# Patient Record
Sex: FEMALE | Race: WHITE | HISPANIC OR LATINO | Employment: UNEMPLOYED | URBAN - NONMETROPOLITAN AREA
[De-identification: names, ages, dates, MRNs, and addresses within clinical notes are randomized per-mention and may not be internally consistent; named-entity substitution may affect disease eponyms.]

---

## 2019-07-11 ENCOUNTER — HOSPITAL ENCOUNTER (OUTPATIENT)
Facility: HOSPITAL | Age: 74
Setting detail: OBSERVATION
Discharge: HOME | End: 2019-07-11
Attending: EMERGENCY MEDICINE | Admitting: FAMILY MEDICINE
Payer: COMMERCIAL

## 2019-07-11 ENCOUNTER — APPOINTMENT (EMERGENCY)
Dept: CT IMAGING | Facility: HOSPITAL | Age: 74
End: 2019-07-11
Payer: COMMERCIAL

## 2019-07-11 ENCOUNTER — APPOINTMENT (OUTPATIENT)
Dept: NUCLEAR MEDICINE | Facility: HOSPITAL | Age: 74
End: 2019-07-11
Payer: COMMERCIAL

## 2019-07-11 ENCOUNTER — APPOINTMENT (OUTPATIENT)
Dept: NON INVASIVE DIAGNOSTICS | Facility: HOSPITAL | Age: 74
End: 2019-07-11
Payer: COMMERCIAL

## 2019-07-11 ENCOUNTER — APPOINTMENT (EMERGENCY)
Dept: RADIOLOGY | Facility: HOSPITAL | Age: 74
End: 2019-07-11
Payer: COMMERCIAL

## 2019-07-11 VITALS
BODY MASS INDEX: 31 KG/M2 | HEART RATE: 63 BPM | RESPIRATION RATE: 18 BRPM | WEIGHT: 168.43 LBS | HEIGHT: 62 IN | DIASTOLIC BLOOD PRESSURE: 60 MMHG | SYSTOLIC BLOOD PRESSURE: 119 MMHG | TEMPERATURE: 97.4 F | OXYGEN SATURATION: 96 %

## 2019-07-11 DIAGNOSIS — E11.9 TYPE 2 DIABETES MELLITUS WITHOUT COMPLICATION, WITHOUT LONG-TERM CURRENT USE OF INSULIN (HCC): ICD-10-CM

## 2019-07-11 DIAGNOSIS — R07.89 OTHER CHEST PAIN: ICD-10-CM

## 2019-07-11 DIAGNOSIS — E87.2 LACTIC ACIDOSIS: ICD-10-CM

## 2019-07-11 DIAGNOSIS — E03.9 HYPOTHYROID: ICD-10-CM

## 2019-07-11 DIAGNOSIS — E78.5 HYPERLIPIDEMIA: ICD-10-CM

## 2019-07-11 DIAGNOSIS — E03.9 HYPOTHYROIDISM: ICD-10-CM

## 2019-07-11 DIAGNOSIS — R07.9 CHEST PAIN: Primary | ICD-10-CM

## 2019-07-11 DIAGNOSIS — R73.9 HYPERGLYCEMIA: ICD-10-CM

## 2019-07-11 PROBLEM — R79.89 ELEVATED LACTIC ACID LEVEL: Status: ACTIVE | Noted: 2019-07-11

## 2019-07-11 PROBLEM — R79.89 ELEVATED TSH: Status: ACTIVE | Noted: 2019-07-11

## 2019-07-11 LAB
ALBUMIN SERPL BCP-MCNC: 3.5 G/DL (ref 3.5–5)
ALP SERPL-CCNC: 117 U/L (ref 46–116)
ALT SERPL W P-5'-P-CCNC: 28 U/L (ref 12–78)
ANION GAP SERPL CALCULATED.3IONS-SCNC: 3 MMOL/L (ref 4–13)
APTT PPP: 31 SECONDS (ref 23–37)
AST SERPL W P-5'-P-CCNC: 19 U/L (ref 5–45)
BASOPHILS # BLD AUTO: 0.04 THOUSANDS/ΜL (ref 0–0.1)
BASOPHILS NFR BLD AUTO: 0 % (ref 0–1)
BILIRUB SERPL-MCNC: 0.3 MG/DL (ref 0.2–1)
BUN SERPL-MCNC: 11 MG/DL (ref 5–25)
CALCIUM SERPL-MCNC: 8.5 MG/DL (ref 8.3–10.1)
CHEST PAIN STATEMENT: NORMAL
CHLORIDE SERPL-SCNC: 106 MMOL/L (ref 100–108)
CHOLEST SERPL-MCNC: 179 MG/DL (ref 50–200)
CO2 SERPL-SCNC: 31 MMOL/L (ref 21–32)
CREAT SERPL-MCNC: 1.16 MG/DL (ref 0.6–1.3)
DEPRECATED D DIMER PPP: <270 NG/ML (FEU)
EOSINOPHIL # BLD AUTO: 0.01 THOUSAND/ΜL (ref 0–0.61)
EOSINOPHIL NFR BLD AUTO: 0 % (ref 0–6)
ERYTHROCYTE [DISTWIDTH] IN BLOOD BY AUTOMATED COUNT: 13.3 % (ref 11.6–15.1)
GFR SERPL CREATININE-BSD FRML MDRD: 46 ML/MIN/1.73SQ M
GLUCOSE SERPL-MCNC: 148 MG/DL (ref 65–140)
HCT VFR BLD AUTO: 40.7 % (ref 34.8–46.1)
HDLC SERPL-MCNC: 47 MG/DL (ref 40–60)
HGB BLD-MCNC: 13 G/DL (ref 11.5–15.4)
IMM GRANULOCYTES # BLD AUTO: 0.03 THOUSAND/UL (ref 0–0.2)
IMM GRANULOCYTES NFR BLD AUTO: 0 % (ref 0–2)
INR PPP: 0.98 (ref 0.84–1.19)
LACTATE SERPL-SCNC: 1.8 MMOL/L (ref 0.5–2)
LACTATE SERPL-SCNC: 2.1 MMOL/L (ref 0.5–2)
LACTATE SERPL-SCNC: 2.2 MMOL/L (ref 0.5–2)
LACTATE SERPL-SCNC: 2.3 MMOL/L (ref 0.5–2)
LDLC SERPL CALC-MCNC: 112 MG/DL (ref 0–100)
LIPASE SERPL-CCNC: 301 U/L (ref 73–393)
LYMPHOCYTES # BLD AUTO: 5.32 THOUSANDS/ΜL (ref 0.6–4.47)
LYMPHOCYTES NFR BLD AUTO: 50 % (ref 14–44)
MAGNESIUM SERPL-MCNC: 2.2 MG/DL (ref 1.6–2.6)
MAX DIASTOLIC BP: 80 MMHG
MAX HEART RATE: 110 BPM
MAX PREDICTED HEART RATE: 146 BPM
MAX. SYSTOLIC BP: 132 MMHG
MCH RBC QN AUTO: 29.3 PG (ref 26.8–34.3)
MCHC RBC AUTO-ENTMCNC: 31.9 G/DL (ref 31.4–37.4)
MCV RBC AUTO: 92 FL (ref 82–98)
MONOCYTES # BLD AUTO: 0.87 THOUSAND/ΜL (ref 0.17–1.22)
MONOCYTES NFR BLD AUTO: 8 % (ref 4–12)
NEUTROPHILS # BLD AUTO: 4.55 THOUSANDS/ΜL (ref 1.85–7.62)
NEUTS SEG NFR BLD AUTO: 42 % (ref 43–75)
NRBC BLD AUTO-RTO: 0 /100 WBCS
PLATELET # BLD AUTO: 247 THOUSANDS/UL (ref 149–390)
PLATELET # BLD AUTO: 262 THOUSANDS/UL (ref 149–390)
PMV BLD AUTO: 8.5 FL (ref 8.9–12.7)
PMV BLD AUTO: 9 FL (ref 8.9–12.7)
POTASSIUM SERPL-SCNC: 4 MMOL/L (ref 3.5–5.3)
PROCALCITONIN SERPL-MCNC: <0.05 NG/ML
PROT SERPL-MCNC: 6.8 G/DL (ref 6.4–8.2)
PROTHROMBIN TIME: 13 SECONDS (ref 11.6–14.5)
PROTOCOL NAME: NORMAL
RBC # BLD AUTO: 4.43 MILLION/UL (ref 3.81–5.12)
REASON FOR TERMINATION: NORMAL
SODIUM SERPL-SCNC: 140 MMOL/L (ref 136–145)
T3 SERPL-MCNC: 0.9 NG/ML (ref 0.6–1.8)
T4 FREE SERPL-MCNC: 0.74 NG/DL (ref 0.76–1.46)
TARGET HR FORMULA: NORMAL
TEST INDICATION: NORMAL
TIME IN EXERCISE PHASE: NORMAL
TRIGL SERPL-MCNC: 99 MG/DL
TROPONIN I SERPL-MCNC: <0.02 NG/ML
TSH SERPL DL<=0.05 MIU/L-ACNC: 11.5 UIU/ML (ref 0.36–3.74)
WBC # BLD AUTO: 10.82 THOUSAND/UL (ref 4.31–10.16)

## 2019-07-11 PROCEDURE — A9502 TC99M TETROFOSMIN: HCPCS

## 2019-07-11 PROCEDURE — 36415 COLL VENOUS BLD VENIPUNCTURE: CPT | Performed by: EMERGENCY MEDICINE

## 2019-07-11 PROCEDURE — 71046 X-RAY EXAM CHEST 2 VIEWS: CPT

## 2019-07-11 PROCEDURE — 96374 THER/PROPH/DIAG INJ IV PUSH: CPT

## 2019-07-11 PROCEDURE — 83690 ASSAY OF LIPASE: CPT | Performed by: EMERGENCY MEDICINE

## 2019-07-11 PROCEDURE — 99285 EMERGENCY DEPT VISIT HI MDM: CPT | Performed by: EMERGENCY MEDICINE

## 2019-07-11 PROCEDURE — 85379 FIBRIN DEGRADATION QUANT: CPT | Performed by: EMERGENCY MEDICINE

## 2019-07-11 PROCEDURE — 93018 CV STRESS TEST I&R ONLY: CPT | Performed by: INTERNAL MEDICINE

## 2019-07-11 PROCEDURE — 84443 ASSAY THYROID STIM HORMONE: CPT | Performed by: EMERGENCY MEDICINE

## 2019-07-11 PROCEDURE — 80061 LIPID PANEL: CPT | Performed by: NURSE PRACTITIONER

## 2019-07-11 PROCEDURE — 85610 PROTHROMBIN TIME: CPT | Performed by: EMERGENCY MEDICINE

## 2019-07-11 PROCEDURE — C9113 INJ PANTOPRAZOLE SODIUM, VIA: HCPCS | Performed by: NURSE PRACTITIONER

## 2019-07-11 PROCEDURE — C9113 INJ PANTOPRAZOLE SODIUM, VIA: HCPCS | Performed by: EMERGENCY MEDICINE

## 2019-07-11 PROCEDURE — 84439 ASSAY OF FREE THYROXINE: CPT | Performed by: INTERNAL MEDICINE

## 2019-07-11 PROCEDURE — 83605 ASSAY OF LACTIC ACID: CPT | Performed by: INTERNAL MEDICINE

## 2019-07-11 PROCEDURE — 78452 HT MUSCLE IMAGE SPECT MULT: CPT

## 2019-07-11 PROCEDURE — 84145 PROCALCITONIN (PCT): CPT | Performed by: NURSE PRACTITIONER

## 2019-07-11 PROCEDURE — 93306 TTE W/DOPPLER COMPLETE: CPT

## 2019-07-11 PROCEDURE — 84480 ASSAY TRIIODOTHYRONINE (T3): CPT | Performed by: INTERNAL MEDICINE

## 2019-07-11 PROCEDURE — 83605 ASSAY OF LACTIC ACID: CPT | Performed by: PHYSICIAN ASSISTANT

## 2019-07-11 PROCEDURE — 83605 ASSAY OF LACTIC ACID: CPT | Performed by: EMERGENCY MEDICINE

## 2019-07-11 PROCEDURE — 80053 COMPREHEN METABOLIC PANEL: CPT | Performed by: EMERGENCY MEDICINE

## 2019-07-11 PROCEDURE — 93016 CV STRESS TEST SUPVJ ONLY: CPT | Performed by: INTERNAL MEDICINE

## 2019-07-11 PROCEDURE — 99220 PR INITIAL OBSERVATION CARE/DAY 70 MINUTES: CPT | Performed by: NURSE PRACTITIONER

## 2019-07-11 PROCEDURE — 99217 PR OBSERVATION CARE DISCHARGE MANAGEMENT: CPT | Performed by: PHYSICIAN ASSISTANT

## 2019-07-11 PROCEDURE — 99285 EMERGENCY DEPT VISIT HI MDM: CPT

## 2019-07-11 PROCEDURE — 85049 AUTOMATED PLATELET COUNT: CPT | Performed by: NURSE PRACTITIONER

## 2019-07-11 PROCEDURE — 84484 ASSAY OF TROPONIN QUANT: CPT | Performed by: EMERGENCY MEDICINE

## 2019-07-11 PROCEDURE — 96361 HYDRATE IV INFUSION ADD-ON: CPT

## 2019-07-11 PROCEDURE — 83735 ASSAY OF MAGNESIUM: CPT | Performed by: EMERGENCY MEDICINE

## 2019-07-11 PROCEDURE — 83605 ASSAY OF LACTIC ACID: CPT | Performed by: NURSE PRACTITIONER

## 2019-07-11 PROCEDURE — 85025 COMPLETE CBC W/AUTO DIFF WBC: CPT | Performed by: EMERGENCY MEDICINE

## 2019-07-11 PROCEDURE — 93017 CV STRESS TEST TRACING ONLY: CPT

## 2019-07-11 PROCEDURE — 93005 ELECTROCARDIOGRAM TRACING: CPT

## 2019-07-11 PROCEDURE — 85730 THROMBOPLASTIN TIME PARTIAL: CPT | Performed by: EMERGENCY MEDICINE

## 2019-07-11 PROCEDURE — 93306 TTE W/DOPPLER COMPLETE: CPT | Performed by: INTERNAL MEDICINE

## 2019-07-11 PROCEDURE — 84484 ASSAY OF TROPONIN QUANT: CPT | Performed by: NURSE PRACTITIONER

## 2019-07-11 PROCEDURE — 74177 CT ABD & PELVIS W/CONTRAST: CPT

## 2019-07-11 PROCEDURE — 78452 HT MUSCLE IMAGE SPECT MULT: CPT | Performed by: INTERNAL MEDICINE

## 2019-07-11 RX ORDER — PANTOPRAZOLE SODIUM 40 MG/1
40 INJECTION, POWDER, FOR SOLUTION INTRAVENOUS ONCE
Status: COMPLETED | OUTPATIENT
Start: 2019-07-11 | End: 2019-07-11

## 2019-07-11 RX ORDER — MAGNESIUM HYDROXIDE/ALUMINUM HYDROXICE/SIMETHICONE 120; 1200; 1200 MG/30ML; MG/30ML; MG/30ML
30 SUSPENSION ORAL ONCE
Status: COMPLETED | OUTPATIENT
Start: 2019-07-11 | End: 2019-07-11

## 2019-07-11 RX ORDER — ASPIRIN 81 MG/1
324 TABLET, CHEWABLE ORAL ONCE
Status: COMPLETED | OUTPATIENT
Start: 2019-07-11 | End: 2019-07-11

## 2019-07-11 RX ORDER — LEVOTHYROXINE SODIUM 0.05 MG/1
50 TABLET ORAL DAILY
Qty: 30 TABLET | Refills: 0 | Status: SHIPPED | OUTPATIENT
Start: 2019-07-11

## 2019-07-11 RX ORDER — ATORVASTATIN CALCIUM 10 MG/1
10 TABLET, FILM COATED ORAL DAILY
Qty: 30 TABLET | Refills: 0 | Status: SHIPPED | OUTPATIENT
Start: 2019-07-11

## 2019-07-11 RX ORDER — NITROGLYCERIN 0.4 MG/1
0.4 TABLET SUBLINGUAL
Status: DISCONTINUED | OUTPATIENT
Start: 2019-07-11 | End: 2019-07-11 | Stop reason: HOSPADM

## 2019-07-11 RX ORDER — PANTOPRAZOLE SODIUM 20 MG/1
20 TABLET, DELAYED RELEASE ORAL DAILY
Qty: 30 TABLET | Refills: 0 | Status: SHIPPED | OUTPATIENT
Start: 2019-07-11

## 2019-07-11 RX ADMIN — REGADENOSON 0.4 MG: 0.08 INJECTION, SOLUTION INTRAVENOUS at 12:19

## 2019-07-11 RX ADMIN — ASPIRIN 81 MG 324 MG: 81 TABLET ORAL at 03:58

## 2019-07-11 RX ADMIN — PANTOPRAZOLE SODIUM 40 MG: 40 INJECTION, POWDER, FOR SOLUTION INTRAVENOUS at 06:56

## 2019-07-11 RX ADMIN — SODIUM CHLORIDE 1000 ML: 0.9 INJECTION, SOLUTION INTRAVENOUS at 03:42

## 2019-07-11 RX ADMIN — ALUMINUM HYDROXIDE, MAGNESIUM HYDROXIDE, AND SIMETHICONE 30 ML: 200; 200; 20 SUSPENSION ORAL at 03:57

## 2019-07-11 RX ADMIN — PANTOPRAZOLE SODIUM 40 MG: 40 INJECTION, POWDER, FOR SOLUTION INTRAVENOUS at 03:42

## 2019-07-11 RX ADMIN — NITROGLYCERIN 0.5 INCH: 20 OINTMENT TOPICAL at 03:58

## 2019-07-11 RX ADMIN — IOHEXOL 100 ML: 350 INJECTION, SOLUTION INTRAVENOUS at 04:20

## 2019-07-11 RX ADMIN — THIAMINE HYDROCHLORIDE 200 MG: 100 INJECTION, SOLUTION INTRAMUSCULAR; INTRAVENOUS at 13:41

## 2019-07-11 RX ADMIN — ENOXAPARIN SODIUM 40 MG: 40 INJECTION SUBCUTANEOUS at 09:06

## 2019-07-11 NOTE — ED NOTES
Patient transported to [id number] [facility], [address] [place] [address]  07/11/19 6425

## 2019-07-11 NOTE — ASSESSMENT & PLAN NOTE
Chief complaint-Patient has substernal and upper epigastric pain that started 20 minutes prior to arrival in emergency room  Initial troponin negative-initial EKG negative will trend for a minimum of 3  Admit to med surge with telemetry  Monitor per protocol  Echo ordered  Cardiac stress diet ordered  Provide supportive care  P r n   Nitro

## 2019-07-11 NOTE — H&P
H&P- Sven Muñoz 1945, 76 y o  female MRN: [id number]    Unit/Bed#: 421-01 Encounter: [id number]    Primary Care Provider: Dilip Vázquez MD   Date and time admitted to hospital: 7/11/2019  3:29 AM        Elevated TSH  Assessment & Plan  TSH elevated at 11,498     * Other chest pain  Assessment & Plan  Chief complaint-Patient has substernal and upper epigastric pain that started 20 minutes prior to arrival in emergency room  Initial troponin negative-initial EKG negative will trend for a minimum of 3  Admit to [facility] with telemetry  Monitor per protocol  Echo ordered  Cardiac stress diet ordered  Provide supportive care  P r n  Nitro    Elevated lactic acid level  Assessment & Plan  Mildly elevated lactate 2 3  Trend lactate until normalization      VTE Prophylaxis: Enoxaparin (Lovenox)  / sequential compression device   Code Status: FULL  POLST: POLST is not applicable to this patient    Anticipated Length of Stay:  Patient will be admitted on an Observation basis with an anticipated length of stay of  less than 2 midnights  Justification for Hospital Stay:  Chest pain    Total Time for Visit, including Counseling / Coordination of Care: 1 hour  Greater than 50% of this total time spent on direct patient counseling and coordination of care  Chief Complaint:   Substernal and upper epigastric pain    History of Present Illness:    Sven Muñoz is a 76 y o  female who only speaks Congolese arrived to the emergency room for evaluation of with the emergency room doctor interpreted as substernal and epigastric pain  Through an  patient states that her pain in his mid epigastric area, states that she had this pain approximately 8 months ago and saw her primary care doctor who lives in [place] who believed it was a form of gastritis    Patient denies any chest pain head venous shortness of breath denies any jaw pain arm pain denies lightheadedness dizziness denies nausea or vomiting admits to chronic constipation requiring laxatives denies diarrhea dysuria urgency or frequency melena or hematuria  Images and labs were collected in the emergency room-see below  Initial troponin is negative at less 0 02 initial EKGs unremarkable  Significant lab findings and elevated TSH again patient denies taking any prior to admission medications  Patient was admitted by emergency room doctor  Review of Systems:    Review of Systems   Constitutional: Positive for chills  Gastrointestinal: Positive for abdominal pain and constipation  All other systems reviewed and are negative  Past Medical and Surgical History:     History reviewed  No pertinent past medical history  Past Surgical History:   Procedure Laterality Date    CHOLECYSTECTOMY         Meds/Allergies:    Prior to Admission medications    Not on File     I have reviewed home medications using [employer]  Allergies: No Known Allergies    Social History:     Marital Status:    Occupation:  Unknown  Patient Pre-hospital Living Situation:  Independent  Patient Pre-hospital Level of Mobility:  Independent  Patient Pre-hospital Diet Restrictions:  Denies  Substance Use History:   Social History     Substance and Sexual Activity   Alcohol Use Not Currently     Social History     Tobacco Use   Smoking Status Never Smoker   Smokeless Tobacco Never Used     Social History     Substance and Sexual Activity   Drug Use Not Currently       Family History:    History reviewed  No pertinent family history  Physical Exam:     Vitals:   Blood Pressure: 115/67 (07/11/19 0500)  Pulse: 72 (07/11/19 0500)  Temperature: 98 7 °F (37 1 °C) (07/11/19 0351)  Temp Source: Temporal (07/11/19 0351)  Respirations: 20 (07/11/19 0500)  Height: 5' 2" (157 5 cm) (07/11/19 0351)  SpO2: 96 % (07/11/19 0500)    Physical Exam   Constitutional: She is oriented to person, place, and time  She appears well-developed and well-nourished     HENT:   Head: Normocephalic and atraumatic  Eyes: Pupils are equal, round, and reactive to light  EOM are normal  Right eye exhibits no discharge  Left eye exhibits no discharge  Neck: Normal range of motion  Neck supple  Cardiovascular: Normal rate, regular rhythm, normal heart sounds and intact distal pulses  Exam reveals no gallop and no friction rub  No murmur heard  Pulmonary/Chest: Effort normal and breath sounds normal  No stridor  No respiratory distress  Abdominal: Soft  Bowel sounds are normal    Musculoskeletal: She exhibits no edema, tenderness or deformity  Neurological: She is alert and oriented to person, place, and time  No cranial nerve deficit  Skin: Skin is warm and dry  Capillary refill takes 2 to 3 seconds  Psychiatric: She has a normal mood and affect  Her behavior is normal  Judgment and thought content normal            Additional Data:     Lab Results: I have personally reviewed pertinent reports  Results from last 7 days   Lab Units 07/11/19  0334   WBC Thousand/uL 10 82*   HEMOGLOBIN g/dL 13 0   HEMATOCRIT % 40 7   PLATELETS Thousands/uL 247   NEUTROS PCT % 42*   LYMPHS PCT % 50*   MONOS PCT % 8   EOS PCT % 0     Results from last 7 days   Lab Units 07/11/19  0334   POTASSIUM mmol/L 4 0   CHLORIDE mmol/L 106   CO2 mmol/L 31   BUN mg/dL 11   CREATININE mg/dL 1 16   CALCIUM mg/dL 8 5   ALK PHOS U/L 117*   ALT U/L 28   AST U/L 19     Results from last 7 days   Lab Units 07/11/19  0334   INR  0 98       Imaging: I have personally reviewed pertinent reports  Ct Abdomen Pelvis With Contrast    Result Date: 7/11/2019  Narrative: CT ABDOMEN AND PELVIS WITH IV CONTRAST INDICATION:   Substernal epigastric pain  COMPARISON:  None  TECHNIQUE:  CT examination of the abdomen and pelvis was performed  Axial, sagittal, and coronal 2D reformatted images were created from the source data and submitted for interpretation  Radiation dose length product (DLP) for this visit:  311 61 mGy-cm     This examination, like all CT scans performed in the [facility], was performed utilizing techniques to minimize radiation dose exposure, including the use of iterative  reconstruction and automated exposure control  IV Contrast:  100 mL of iohexol (OMNIPAQUE) Enteric Contrast:  Enteric contrast was not administered  FINDINGS: ABDOMEN LOWER CHEST:  No clinically significant abnormality identified in the visualized lower chest  LIVER/BILIARY TREE:  Unremarkable  GALLBLADDER:  Gallbladder is surgically absent  SPLEEN:  Unremarkable  PANCREAS:  Unremarkable  ADRENAL GLANDS:  Unremarkable  KIDNEYS/URETERS:  One or more sharply circumscribed subcentimeter renal hypodensities are noted  These lesions are too small to accurately characterize, but are statistically most likely to represent benign cortical renal cyst(s)  According to the guidelines published in the [facility] Paper of the ACR Incidental Findings Committee (Radiology 2010), no further workup of these lesions is recommended  STOMACH AND BOWEL:  Unremarkable  APPENDIX:  No findings to suggest appendicitis  ABDOMINOPELVIC CAVITY:  No ascites or free intraperitoneal air  No lymphadenopathy  VESSELS:  Unremarkable for patient's age  PELVIS REPRODUCTIVE ORGANS:  Calcifications within the uterus likely representing fibroids are seen  URINARY BLADDER:  Unremarkable  ABDOMINAL WALL/INGUINAL REGIONS:  Unremarkable  OSSEOUS STRUCTURES:  No acute fracture or destructive osseous lesion  Impression: Calcifications within the uterus likely representing fibroids  Workstation performed: [id number]       EKG, Pathology, and Other Studies Reviewed on Admission:   · EKG: reviewed    Allscripts / Epic Records Reviewed: Yes     ** Please Note: This note has been constructed using a voice recognition system   **

## 2019-07-11 NOTE — ED PROVIDER NOTES
History  Chief Complaint   Patient presents with    Chest Pain     Patient has substernal and upper epigastric pain that started 20 minutes ago  Patient spoken to via  phone  #[phone]     75-year-old female who speaks primarily English presents with her family for evaluation of chest pain   which began 30 minutes ago  Patient presents to the emerged department pale, sweaty and diaphoretic  Patient lives in [place] and she is currently traveling with her family who are from [place] tender in the area for work  Patient States the pain awoke her from sleep 30 minutes ago  Patient states that she is supposed to be taking medication for diabetes which she is not taking that 3 months along with medication for cholesterol hypothyroidism medications for her pancreas  She complains of tender at 10 pain in her substernal region  Radiating to her back        History provided by:  Patient  Chest Pain   Pain location:  Substernal area  Pain quality: aching, dull and pressure    Pain radiates to:  Upper back  Pain severity:  Severe  Onset quality:  Sudden  Duration:  30 minutes  Timing:  Constant  Progression:  Waxing and waning  Chronicity:  New  Context: breathing and at rest    Relieved by:  Nothing  Worsened by:  Nothing tried  Ineffective treatments:  None tried  Associated symptoms: diaphoresis, fatigue, nausea and shortness of breath    Associated symptoms: no abdominal pain, no AICD problem, no altered mental status, no anorexia, no anxiety, no back pain, no claudication, no cough, no dizziness and no headache    Risk factors: diabetes mellitus and obesity    Risk factors: no aortic disease and no birth control        None       History reviewed  No pertinent past medical history  Past Surgical History:   Procedure Laterality Date    CHOLECYSTECTOMY         History reviewed  No pertinent family history  I have reviewed and agree with the history as documented      Social History     Tobacco Use    Smoking status: Never Smoker    Smokeless tobacco: Never Used   Substance Use Topics    Alcohol use: Not Currently    Drug use: Not Currently        Review of Systems   Constitutional: Positive for diaphoresis and fatigue  HENT: Negative  Negative for congestion, dental problem and drooling  Eyes: Negative  Negative for pain, discharge and itching  Respiratory: Positive for shortness of breath  Negative for cough  Cardiovascular: Positive for chest pain  Negative for claudication  Gastrointestinal: Positive for nausea  Negative for abdominal pain and anorexia  Endocrine: Negative  Negative for cold intolerance, heat intolerance and polydipsia  Genitourinary: Negative  Negative for difficulty urinating, dyspareunia and dysuria  Musculoskeletal: Negative for back pain  Skin: Negative for color change, pallor and rash  Allergic/Immunologic: Negative  Negative for environmental allergies and food allergies  Neurological: Negative  Negative for dizziness, facial asymmetry and headaches  Hematological: Negative  Psychiatric/Behavioral: Negative  Negative for agitation, behavioral problems, confusion and decreased concentration  All other systems reviewed and are negative  Physical Exam  Physical Exam   Constitutional: She appears well-developed and well-nourished  HENT:   Mouth/Throat: Oropharynx is clear and moist    Eyes: Pupils are equal, round, and reactive to light  Cardiovascular: Normal rate  Exam reveals no friction rub  No murmur heard  Pulmonary/Chest: Effort normal  No stridor  No respiratory distress  Abdominal: Soft  Normal appearance and bowel sounds are normal  She exhibits no distension and no ascites  There is no rigidity and no CVA tenderness  Neurological: She is alert  Skin: Skin is warm  Capillary refill takes less than 2 seconds  She is not diaphoretic  No cyanosis  Psychiatric: She has a normal mood and affect     Vitals reviewed  Vital Signs  ED Triage Vitals   Temperature Pulse Respirations Blood Pressure SpO2   07/11/19 0351 07/11/19 0351 07/11/19 0351 07/11/19 0351 07/11/19 0351   98 7 °F (37 1 °C) 59 20 127/73 100 %      Temp Source Heart Rate Source Patient Position - Orthostatic VS BP Location FiO2 (%)   07/11/19 0351 07/11/19 0430 07/11/19 0430 07/11/19 0430 --   Temporal Monitor Lying Left arm       Pain Score       07/11/19 0351       Worst Possible Pain           Vitals:    07/11/19 0351 07/11/19 0430   BP: 127/73 115/59   Pulse: 59 73   Patient Position - Orthostatic VS:  Lying         Visual Acuity      ED Medications  Medications   aluminum-magnesium hydroxide-simethicone (MYLANTA) 200-200-20 mg/5 mL oral suspension 30 mL (30 mL Oral Given 7/11/19 0357)   sodium chloride 0 9 % bolus 1,000 mL (0 mL Intravenous Stopped 7/11/19 0438)   pantoprazole (PROTONIX) injection 40 mg (40 mg Intravenous Given 7/11/19 0342)   aspirin chewable tablet 324 mg (324 mg Oral Given 7/11/19 0358)   nitroglycerin (NITRO-BID) 2 % TD ointment 0 5 inch (0 5 inches Topical Given 7/11/19 0358)   iohexol (OMNIPAQUE) 350 MG/ML injection (SINGLE-DOSE) 100 mL (100 mL Intravenous Given 7/11/19 0420)       Diagnostic Studies  Results Reviewed     Procedure Component Value Units Date/Time    TSH [[id number]]  (Abnormal) Collected:  07/11/19 0334    Lab Status:  Final result Specimen:  Blood Updated:  07/11/19 0411     TSH 3RD GENERATON 11 498 uIU/mL     Narrative:       Patients undergoing fluorescein dye angiography may retain small amounts of fluorescein in the body for 48-72 hours post procedure  Samples containing fluorescein can produce falsely depressed TSH values  If the patient had this procedure,a specimen should be resubmitted post fluorescein clearance        Lactic acid, plasma [[id number]]  (Abnormal) Collected:  07/11/19 0334    Lab Status:  Final result Specimen:  Blood from Arm, Right Updated:  07/11/19 0400     LACTIC ACID 2 3 mmol/L Narrative:       Result may be elevated if tourniquet was used during collection      Troponin I [[id number]]  (Normal) Collected:  07/11/19 0334    Lab Status:  Final result Specimen:  Blood from Arm, Right Updated:  07/11/19 0358     Troponin I <0 02 ng/mL     Comprehensive metabolic panel [[id number]]  (Abnormal) Collected:  07/11/19 0334    Lab Status:  Final result Specimen:  Blood from Arm, Right Updated:  07/11/19 0356     Sodium 140 mmol/L      Potassium 4 0 mmol/L      Chloride 106 mmol/L      CO2 31 mmol/L      ANION GAP 3 mmol/L      BUN 11 mg/dL      Creatinine 1 16 mg/dL      Glucose 148 mg/dL      Calcium 8 5 mg/dL      AST 19 U/L      ALT 28 U/L      Alkaline Phosphatase 117 U/L      Total Protein 6 8 g/dL      Albumin 3 5 g/dL      Total Bilirubin 0 30 mg/dL      eGFR 46 ml/min/1 73sq m     Narrative:       [place] guidelines for Chronic Kidney Disease (CKD):     Stage 1 with normal or high GFR (GFR > 90 mL/min/1 73 square meters)    Stage 2 Mild CKD (GFR = 60-89 mL/min/1 73 square meters)    Stage 3A Moderate CKD (GFR = 45-59 mL/min/1 73 square meters)    Stage 3B Moderate CKD (GFR = 30-44 mL/min/1 73 square meters)    Stage 4 Severe CKD (GFR = 15-29 mL/min/1 73 square meters)    Stage 5 End Stage CKD (GFR <15 mL/min/1 73 square meters)  Note: GFR calculation is accurate only with a steady state creatinine    D-Dimer [[id number]]  (Normal) Collected:  07/11/19 0334    Lab Status:  Final result Specimen:  Blood from Arm, Right Updated:  07/11/19 0355     D-Dimer, Quant <270 ng/ml (FEU)     Protime-INR [[id number]]  (Normal) Collected:  07/11/19 0334    Lab Status:  Final result Specimen:  Blood from Arm, Right Updated:  07/11/19 0350     Protime 13 0 seconds      INR 0 98    APTT [[id number]]  (Normal) Collected:  07/11/19 0334    Lab Status:  Final result Specimen:  Blood from Arm, Right Updated:  07/11/19 0350     PTT 31 seconds     Lipase [[id number]]  (Normal) Collected:  07/11/19 0334    Lab Status:  Final result Specimen:  Blood from Arm, Right Updated:  07/11/19 0350     Lipase 301 u/L     Magnesium [[id number]]  (Normal) Collected:  07/11/19 0334    Lab Status:  Final result Specimen:  Blood from Arm, Right Updated:  07/11/19 0350     Magnesium 2 2 mg/dL     CBC and differential [[id number]]  (Abnormal) Collected:  07/11/19 0334    Lab Status:  Final result Specimen:  Blood from Arm, Right Updated:  07/11/19 0340     WBC 10 82 Thousand/uL      RBC 4 43 Million/uL      Hemoglobin 13 0 g/dL      Hematocrit 40 7 %      MCV 92 fL      MCH 29 3 pg      MCHC 31 9 g/dL      RDW 13 3 %      MPV 8 5 fL      Platelets 437 Thousands/uL      nRBC 0 /100 WBCs      Neutrophils Relative 42 %      Immat GRANS % 0 %      Lymphocytes Relative 50 %      Monocytes Relative 8 %      Eosinophils Relative 0 %      Basophils Relative 0 %      Neutrophils Absolute 4 55 Thousands/µL      Immature Grans Absolute 0 03 Thousand/uL      Lymphocytes Absolute 5 32 Thousands/µL      Monocytes Absolute 0 87 Thousand/µL      Eosinophils Absolute 0 01 Thousand/µL      Basophils Absolute 0 04 Thousands/µL                  CT abdomen pelvis with contrast   Final Result by Neelima Cary DO (07/11 0505)      Calcifications within the uterus likely representing fibroids              Workstation performed: [id number]         XR chest 2 views   ED Interpretation by Mayda Reynolds DO (07/11 0411)   No infiltrate                  Procedures  Procedures       ED Course         HEART Risk Score      Most Recent Value   History  1 Filed at: 07/11/2019 0334   ECG  1 Filed at: 07/11/2019 0334   Age  2 Filed at: 07/11/2019 0334   Risk Factors  1 Filed at: 07/11/2019 0334   Troponin  0 Filed at: 07/11/2019 0334   Heart Score Risk Calculator   History  1 Filed at: 07/11/2019 0334   ECG  1 Filed at: 07/11/2019 0334   Age  2 Filed at: 07/11/2019 0334   Risk Factors  1 Filed at: 07/11/2019 0334   Troponin  0 Filed at: 07/11/2019 0334   HEART Score  5 Filed at: 07/11/2019 0334   HEART Score  5 Filed at: 07/11/2019 0334            PERC Rule for PE      Most Recent Value   PERC Rule for PE   Age >=50  1 Filed at: 07/11/2019 0336   HR >=100  0 Filed at: 07/11/2019 0336   O2 Sat on room air < 95%  0 Filed at: 07/11/2019 0336   History of PE or DVT  0 Filed at: 07/11/2019 2285   Recent trauma or surgery  0 Filed at: 07/11/2019 0336   Hemoptysis  0 Filed at: 07/11/2019 0336   Exogenous estrogen  0 Filed at: 07/11/2019 0336   Unilateral leg swelling  0 Filed at: 07/11/2019 0336   PERC Rule for PE Results  1 Filed at: 07/11/2019 2378                      MDM  Number of Diagnoses or Management Options  Chest pain:   Hyperglycemia:   Lactic acidosis:   Diagnosis management comments:  Differential diagnosis 1  Acute coronary syndrome 2  Pulmonary embolism 3  Pneumonia 4  Gastritis 5  Esophagitis 6  Pancreatitis   patient is diabetic hyper lipidemia cook possibly hypertensive and does not take any of her medications for at least the last 3 months     0400     patient states that after the aspirin and nitroglycerin her pain has subsided       Amount and/or Complexity of Data Reviewed  Clinical lab tests: ordered and reviewed  Tests in the radiology section of CPT®: ordered and reviewed  Tests in the medicine section of CPT®: reviewed and ordered    Risk of Complications, Morbidity, and/or Mortality  Presenting problems: high  Diagnostic procedures: high  Management options: high        Disposition  Final diagnoses:   Chest pain   Lactic acidosis   Hyperglycemia   Hypothyroid     Time reflects when diagnosis was documented in both MDM as applicable and the Disposition within this note     Time User Action Codes Description Comment    7/11/2019  3:34 AM Kaushal Marci Add [K29 70] Gastritis     7/11/2019  3:54 AM Kaushal Marci Add [R07 9] Chest pain     7/11/2019  4:00 AM Kaushal Marci Modify [R07 9] Chest pain 7/11/2019  4:00 AM Acorn Lightning Remove [K29 70] Gastritis     7/11/2019  4:00 AM Acorn Lightning Add [E87 2] Lactic acidosis     7/11/2019  4:01 AM Acorn Lightning Add [R73 9] Hyperglycemia     7/11/2019  4:11 AM Acorn Lightning Add [E03 9] Hypothyroid       ED Disposition     ED Disposition Condition Date/Time Comment    Admit Stable Thu Jul 11, 2019  4:00 AM       Follow-up Information    None         Patient's Medications    No medications on file     No discharge procedures on file      ED Provider  Electronically Signed by           Halina Pond DO  07/11/19 4992

## 2019-07-11 NOTE — ASSESSMENT & PLAN NOTE
Mildly elevated lactate 2 3 on admission which resolved with IV thiamine  This may be due to Metformin

## 2019-07-11 NOTE — ED PROCEDURE NOTE
PROCEDURE  ECG 12 Lead Documentation Only  Date/Time: 7/11/2019 3:34 AM  Performed by: Wilbert Issa DO  Authorized by: Wilbert Issa DO     Indications / Diagnosis:  Epigastric pain   ECG reviewed by me, the ED Provider: yes    Patient location:  ED  Previous ECG:     Previous ECG:  Unavailable  Interpretation:     Interpretation: non-specific    Rate:     ECG rate:  65    ECG rate assessment: normal    Rhythm:     Rhythm: sinus rhythm    ST segments:     ST segments:  Non-specific         Wilbert Issa DO  07/11/19 0335

## 2019-07-11 NOTE — RESPIRATORY THERAPY NOTE
RT Protocol Note  Alejandro Dixon 76 y o  female MRN: [id number]  Unit/Bed#: 577-92 Encounter: [id number]    Assessment    Principal Problem:    Other chest pain  Active Problems:    Elevated TSH    Elevated lactic acid level      Home Pulmonary Medications:  none       History reviewed  No pertinent past medical history  Social History     Socioeconomic History    Marital status:      Spouse name: None    Number of children: None    Years of education: None    Highest education level: None   Occupational History    None   Social Needs    Financial resource strain: None    Food insecurity:     Worry: None     Inability: None    Transportation needs:     Medical: None     Non-medical: None   Tobacco Use    Smoking status: Never Smoker    Smokeless tobacco: Never Used   Substance and Sexual Activity    Alcohol use: Not Currently    Drug use: Not Currently    Sexual activity: None   Lifestyle    Physical activity:     Days per week: None     Minutes per session: None    Stress: None   Relationships    Social connections:     Talks on phone: None     Gets together: None     Attends Gnosticist service: None     Active member of club or organization: None     Attends meetings of clubs or organizations: None     Relationship status: None    Intimate partner violence:     Fear of current or ex partner: None     Emotionally abused: None     Physically abused: None     Forced sexual activity: None   Other Topics Concern    None   Social History Narrative    None       Subjective         Objective    Physical Exam:   Assessment Type: Assess only  General Appearance: Alert, Awake  Respiratory Pattern: Normal  Chest Assessment: Chest expansion symmetrical  Bilateral Breath Sounds: Clear, Diminished    Vitals:  Blood pressure 102/65, pulse 62, temperature 97 5 °F (36 4 °C), temperature source Temporal, resp  rate 16, height 5' 2" (1 575 m), weight 76 4 kg (168 lb 6 9 oz), SpO2 96 %            Imaging and other studies: I have personally reviewed pertinent reports              Plan    Respiratory Plan: Discontinue Protocol, No distress/Pulmonary history

## 2019-07-11 NOTE — ASSESSMENT & PLAN NOTE
TSH elevated at 11,498   The patient admits to taking medication for her thyroid but did not bring this medication with her from [place] and will not be returning home until September  The patient was given a script for Levothyroxine 50 mcg po daily which is her current dose per Care Everywhere  Outpatient follow-up with PCP

## 2019-07-11 NOTE — DISCHARGE SUMMARY
Discharge- Alejandro Dixon 1945, 76 y o  female MRN: [id number]    Unit/Bed#: 421-01 Encounter: [id number]    Primary Care Provider: Mikala Smiley MD   Date and time admitted to hospital: 7/11/2019  3:29 AM        * Other chest pain  Assessment & Plan  Chief complaint-Patient has substernal and upper epigastric pain that started 20 minutes prior to arrival in emergency room  Troponin <0 02 x 3 sets  Nuclear stress test:  IMPRESSIONS: Normal study after pharmacologic vasodilation  Myocardial perfusion imaging was normal at rest and with stress  Echocardiogram:   SUMMARY     LEFT VENTRICLE:  Systolic function was normal  Ejection fraction was estimated to be 65 %  Although no diagnostic regional wall motion abnormality was identified, this possibility cannot be completely excluded on the basis of this study  Doppler parameters were consistent with abnormal left ventricular relaxation (grade 1 diastolic dysfunction)      TRICUSPID VALVE:  There was mild regurgitation  CT abdomen pelvis:   IMPRESSION:     Calcifications within the uterus likely representing fibroids  I reviewed the patients records in [facility]  The patient was diagnosed with H Pylori in May 2019 and scripted medications for treatment  The patient is unable to tell me if she completed this treatment  The family that is present at the bedside does not live with her in [place] and is unable to provide any information regarding this  If the patient has not completed treatment the H Pylori and/or ulcers could potentially be the cause of her epigastric pain  The patient was given a script for Protonix 20 mg po daily  She was instructed to follow-up with her GI doctor when she returns to [place] in September  She was also given the information of out GI doctors at this [facility] if she continues to have this epigastric pain and needs to be seen sooner  Outpatient follow-up with PCP when she returns to [place]       Elevated lactic acid level  Assessment & Plan  Mildly elevated lactate 2 3 on admission which resolved with IV thiamine  This may be due to Metformin  Hypothyroidism  Assessment & Plan  TSH elevated at 11,498   The patient admits to taking medication for her thyroid but did not bring this medication with her from [place] and will not be returning home until September  The patient was given a script for Levothyroxine 50 mcg po daily which is her current dose per Care Everywhere  Outpatient follow-up with PCP  Type 2 diabetes mellitus without complication, without long-term current use of insulin ([facility])  Assessment & Plan  No results found for: HGBA1C    No results for input(s): POCGLU in the last 72 hours  Blood Sugar Average: Last 72 hrs:    Per Care Everywhere the patient is suppose to be taking Metformin 500 mg po daily  This is likely the cause of the patient's lactic acidosis  It is recommended the patient stop taking Metformin  She was started on Trajenta 5 mg PO daily  She should also be on a low dose ACE-I for rental protection in the setting of diabetes if her blood pressure allows  The patient will need to follow-up PCP regarding this  Hyperlipidemia  Assessment & Plan  Continue Lipitor 10 mg po daily  Discharging Physician / Practitioner: Graham Lazaro PA-C  PCP: Salome Esquivel MD  Admission Date:   Admission Orders (From admission, onward)    Ordered        07/11/19 0509  Place in Observation (expected length of stay for this patient is less than two midnights)  Once             Discharge Date: 07/11/19    Resolved Problems  Date Reviewed: 7/11/2019    None          Consultations During Hospital Stay:  · none    Procedures Performed:   · none    Significant Findings / Test Results:   Xr Chest 2 Views    Result Date: 7/11/2019  Impression: No acute cardiopulmonary disease   Workstation performed: [id number]     Ct Abdomen Pelvis With Contrast    Result Date: 7/11/2019  · Impression: Calcifications within the uterus likely representing fibroids  Workstation performed: [id number]     Incidental Findings:   · none     Test Results Pending at Discharge (will require follow up):   · none     Outpatient Tests Requested:  · none    Complications:  none    Reason for Admission: chest pain/epigastric pain    Hospital Course:     Kirsty Spivey is a 76 y o  female patient who originally presented to the hospital on 7/11/2019 due to substernal and epigastric pain  Through an  patient states that her pain in his mid epigastric area, states that she had this pain approximately 8 months ago and saw her primary care doctor who lives in [place] who believed it was a form of gastritis  Patient denies any chest pain head venous shortness of breath denies any jaw pain arm pain denies lightheadedness dizziness denies nausea or vomiting admits to chronic constipation requiring laxatives denies diarrhea dysuria urgency or frequency melena or hematuria  Images and labs were collected in the emergency room-see below  Initial troponin is negative at less 0 02 initial EKGs unremarkable  Significant lab findings and elevated TSH again patient denies taking any prior to admission medications  Please see above list of diagnoses and related plan for additional information  Condition at Discharge: good     Discharge Day Visit / Exam:     Subjective:    Vitals: Blood Pressure: 119/60 (07/11/19 1456)  Pulse: 63 (07/11/19 1456)  Temperature: (!) 97 4 °F (36 3 °C) (07/11/19 1456)  Temp Source: Temporal (07/11/19 1456)  Respirations: 18 (07/11/19 1456)  Height: 5' 2" (157 5 cm) (07/11/19 0545)  Weight - Scale: 76 4 kg (168 lb 6 9 oz) (07/11/19 0545)  SpO2: 96 % (07/11/19 1456)  Exam:   Physical Exam   Constitutional: She is oriented to person, place, and time  She appears well-developed and well-nourished  HENT:   Head: Normocephalic and atraumatic  Cardiovascular: Normal rate and regular rhythm  Pulmonary/Chest: Effort normal and breath sounds normal  She has no wheezes  She has no rales  Abdominal: Soft  Bowel sounds are normal  She exhibits no distension  There is no tenderness  Neurological: She is alert and oriented to person, place, and time  No cranial nerve deficit  Skin: Skin is warm and dry  Nursing note and vitals reviewed  Discussion with Family: family updated at bedside with interpretation phone    Discharge instructions/Information to patient and family:   See after visit summary for information provided to patient and family  Provisions for Follow-Up Care:  See after visit summary for information related to follow-up care and any pertinent home health orders  Disposition:     Home    For Discharges to [facility] SNF:   · Not Applicable to this Patient - Not Applicable to this Patient    Planned Readmission: no     Discharge Statement:  I spent 25 minutes discharging the patient  This time was spent on the day of discharge  I had direct contact with the patient on the day of discharge  Greater than 50% of the total time was spent examining patient, answering all patient questions, arranging and discussing plan of care with patient as well as directly providing post-discharge instructions  Additional time then spent on discharge activities  Discharge Medications:  See after visit summary for reconciled discharge medications provided to patient and family        ** Please Note: This note has been constructed using a voice recognition system **

## 2019-07-11 NOTE — ASSESSMENT & PLAN NOTE
Chief complaint-Patient has substernal and upper epigastric pain that started 20 minutes prior to arrival in emergency room  Troponin <0 02 x 3 sets  Nuclear stress test:  IMPRESSIONS: Normal study after pharmacologic vasodilation  Myocardial perfusion imaging was normal at rest and with stress  Echocardiogram:   SUMMARY     LEFT VENTRICLE:  Systolic function was normal  Ejection fraction was estimated to be 65 %  Although no diagnostic regional wall motion abnormality was identified, this possibility cannot be completely excluded on the basis of this study  Doppler parameters were consistent with abnormal left ventricular relaxation (grade 1 diastolic dysfunction)      TRICUSPID VALVE:  There was mild regurgitation  CT abdomen pelvis:   IMPRESSION:     Calcifications within the uterus likely representing fibroids  I reviewed the patients records in [facility]  The patient was diagnosed with H Pylori in May 2019 and scripted medications for treatment  The patient is unable to tell me if she completed this treatment  The family that is present at the bedside does not live with her in [place] and is unable to provide any information regarding this  If the patient has not completed treatment the H Pylori and/or ulcers could potentially be the cause of her epigastric pain  The patient was given a script for Protonix 20 mg po daily  She was instructed to follow-up with her GI doctor when she returns to [place] in September  She was also given the information of out GI doctors at this [facility] if she continues to have this epigastric pain and needs to be seen sooner  Outpatient follow-up with PCP when she returns to [place]

## 2019-07-11 NOTE — PLAN OF CARE
Problem: PAIN - ADULT  Goal: Verbalizes/displays adequate comfort level or baseline comfort level  Description  Interventions:  - Encourage patient to monitor pain and request assistance  - Assess pain using appropriate pain scale  - Administer analgesics based on type and severity of pain and evaluate response  - Implement non-pharmacological measures as appropriate and evaluate response  - Consider cultural and social influences on pain and pain management  - Notify physician/advanced practitioner if interventions unsuccessful or patient reports new pain  Outcome: Progressing     Problem: INFECTION - ADULT  Goal: Absence or prevention of progression during hospitalization  Description  INTERVENTIONS:  - Assess and monitor for signs and symptoms of infection  - Monitor lab/diagnostic results  - Monitor all insertion sites, i e  indwelling lines, tubes, and drains  - [place] appropriate cooling/warming therapies per order  - Administer medications as ordered  - Instruct and encourage patient and family to use good hand hygiene technique  - Identify and instruct in appropriate isolation precautions for identified infection/condition   Outcome: Progressing     Problem: SAFETY ADULT  Goal: Patient will remain free of falls  Description  INTERVENTIONS:  - Assess patient frequently for physical needs  -  Identify cognitive and physical deficits and behaviors that affect risk of falls    -  [place] fall precautions as indicated by assessment   - Educate patient/family on patient safety including physical limitations  - Instruct patient to call for assistance with activity based on assessment  - Modify environment to reduce risk of injury  - Consider OT/PT consult to assist with strengthening/mobility  Outcome: Progressing  Goal: Maintain or return to baseline ADL function  Description  INTERVENTIONS:  -  Assess patient's ability to carry out ADLs; assess patient's baseline for ADL function and identify physical deficits which impact ability to perform ADLs (bathing, care of mouth/teeth, toileting, grooming, dressing, etc )  - Assess/evaluate cause of self-care deficits   - Assess range of motion  - Assess patient's mobility; develop plan if impaired  - Assess patient's need for assistive devices and provide as appropriate  - Encourage maximum independence but intervene and supervise when necessary  ¯ Involve family in performance of ADLs  ¯ Assess for home care needs following discharge   ¯ Request OT consult to assist with ADL evaluation and planning for discharge  ¯ Provide patient education as appropriate  Outcome: Progressing  Goal: Maintain or return mobility status to optimal level  Description  INTERVENTIONS:  - Assess patient's baseline mobility status (ambulation, transfers, stairs, etc )    - Identify cognitive and physical deficits and behaviors that affect mobility  - Identify mobility aids required to assist with transfers and/or ambulation (gait belt, sit-to-stand, lift, walker, cane, etc )  - [place] fall precautions as indicated by assessment  - Record patient progress and toleration of activity level on Mobility SBAR; progress patient to next Phase/Stage  - Instruct patient to call for assistance with activity based on assessment  - Request Rehabilitation consult to assist with strengthening/weightbearing, etc   Outcome: Progressing     Problem: DISCHARGE PLANNING  Goal: Discharge to home or other facility with appropriate resources  Description  INTERVENTIONS:  - Identify barriers to discharge w/patient and caregiver  - Arrange for needed discharge resources and transportation as appropriate  - Identify discharge learning needs (meds, wound care, etc )  - Arrange for interpretive services to assist at discharge as needed  - Refer to Case Management Department for coordinating discharge planning if the patient needs post-hospital services based on physician/advanced practitioner order or complex needs related to functional status, cognitive ability, or social support system  Outcome: Progressing     Problem: Knowledge Deficit  Goal: Patient/family/caregiver demonstrates understanding of disease process, treatment plan, medications, and discharge instructions  Description  Complete learning assessment and assess knowledge base  Interventions:  - Provide teaching at level of understanding  - Provide teaching via preferred learning methods  Outcome: Progressing     Problem: CARDIOVASCULAR - ADULT  Goal: Maintains optimal cardiac output and hemodynamic stability  Description  INTERVENTIONS:  - Monitor I/O, vital signs and rhythm  - Monitor for S/S and trends of decreased cardiac output i e  bleeding, hypotension  - Administer and titrate ordered vasoactive medications to optimize hemodynamic stability  - Assess quality of pulses, skin color and temperature  - Assess for signs of decreased coronary artery perfusion - ex  Angina  - Instruct patient to report change in severity of symptoms  Outcome: Progressing  Goal: Absence of cardiac dysrhythmias or at baseline rhythm  Description  INTERVENTIONS:  - Continuous cardiac monitoring, monitor vital signs, obtain 12 lead EKG if indicated  - Administer antiarrhythmic and heart rate control medications as ordered  - Monitor electrolytes and administer replacement therapy as ordered  Outcome: Progressing     Problem: Nutrition/Hydration-ADULT  Goal: Nutrient/Hydration intake appropriate for improving, restoring or maintaining nutritional needs  Description  Monitor and assess patient's nutrition/hydration status for malnutrition (ex- brittle hair, bruises, dry skin, pale skin and conjunctiva, muscle wasting, smooth red tongue, and disorientation)  Collaborate with interdisciplinary team and initiate plan and interventions as ordered  Monitor patient's weight and dietary intake as ordered or per policy  Utilize nutrition screening tool and intervene per policy   Determine patient's food preferences and provide high-protein, high-caloric foods as appropriate       INTERVENTIONS:  - Monitor oral intake, urinary output, labs, and treatment plans  - Assess nutrition and hydration status and recommend course of action  - Evaluate amount of meals eaten  - Assist patient with eating if necessary   - Allow adequate time for meals  - Recommend/ encourage appropriate diets, oral nutritional supplements, and vitamin/mineral supplements  - Order, calculate, and assess calorie counts as needed  - Recommend, monitor, and adjust tube feedings and TPN/PPN based on assessed needs  - Assess need for intravenous fluids  - Provide specific nutrition/hydration education as appropriate  - Include patient/family/caregiver in decisions related to nutrition  Outcome: Progressing     Problem: GASTROINTESTINAL - ADULT  Goal: Minimal or absence of nausea and/or vomiting  Description  INTERVENTIONS:  - Administer IV fluids as ordered to ensure adequate hydration  - Maintain NPO status until nausea and vomiting are resolved  - Nasogastric tube as ordered  - Administer ordered antiemetic medications as needed  - Provide nonpharmacologic comfort measures as appropriate  - Advance diet as tolerated, if ordered  - Nutrition services referral to assist patient with adequate nutrition and appropriate food choices  Outcome: Progressing  Goal: Maintains adequate nutritional intake  Description  INTERVENTIONS:  - Monitor percentage of each meal consumed  - Identify factors contributing to decreased intake, treat as appropriate  - Assist with meals as needed  - Monitor I&O, WT and lab values  - Obtain nutrition services referral as needed  Outcome: Progressing     Problem: METABOLIC, FLUID AND ELECTROLYTES - ADULT  Goal: Glucose maintained within target range  Description  INTERVENTIONS:  - Monitor Blood Glucose as ordered  - Assess for signs and symptoms of hyperglycemia and hypoglycemia  - Administer ordered medications to maintain glucose within target range  - Assess nutritional intake and initiate nutrition service referral as needed  Outcome: Progressing

## 2019-07-11 NOTE — NURSING NOTE
Instructions reviewed with patient and her son  Instructions printed in [address]  Patient verbalized understanding and offered no complaints at time of d/c

## 2019-07-11 NOTE — PLAN OF CARE
Problem: PAIN - ADULT  Goal: Verbalizes/displays adequate comfort level or baseline comfort level  Description  Interventions:  - Encourage patient to monitor pain and request assistance  - Assess pain using appropriate pain scale  - Administer analgesics based on type and severity of pain and evaluate response  - Implement non-pharmacological measures as appropriate and evaluate response  - Consider cultural and social influences on pain and pain management  - Notify physician/advanced practitioner if interventions unsuccessful or patient reports new pain  Outcome: Adequate for Discharge     Problem: INFECTION - ADULT  Goal: Absence or prevention of progression during hospitalization  Description  INTERVENTIONS:  - Assess and monitor for signs and symptoms of infection  - Monitor lab/diagnostic results  - Monitor all insertion sites, i e  indwelling lines, tubes, and drains  - [place] appropriate cooling/warming therapies per order  - Administer medications as ordered  - Instruct and encourage patient and family to use good hand hygiene technique  - Identify and instruct in appropriate isolation precautions for identified infection/condition   Outcome: Adequate for Discharge     Problem: SAFETY ADULT  Goal: Patient will remain free of falls  Description  INTERVENTIONS:  - Assess patient frequently for physical needs  -  Identify cognitive and physical deficits and behaviors that affect risk of falls    -  [place] fall precautions as indicated by assessment   - Educate patient/family on patient safety including physical limitations  - Instruct patient to call for assistance with activity based on assessment  - Modify environment to reduce risk of injury  - Consider OT/PT consult to assist with strengthening/mobility  Outcome: Adequate for Discharge  Goal: Maintain or return to baseline ADL function  Description  INTERVENTIONS:  -  Assess patient's ability to carry out ADLs; assess patient's baseline for ADL function and identify physical deficits which impact ability to perform ADLs (bathing, care of mouth/teeth, toileting, grooming, dressing, etc )  - Assess/evaluate cause of self-care deficits   - Assess range of motion  - Assess patient's mobility; develop plan if impaired  - Assess patient's need for assistive devices and provide as appropriate  - Encourage maximum independence but intervene and supervise when necessary  ¯ Involve family in performance of ADLs  ¯ Assess for home care needs following discharge   ¯ Request OT consult to assist with ADL evaluation and planning for discharge  ¯ Provide patient education as appropriate  Outcome: Adequate for Discharge  Goal: Maintain or return mobility status to optimal level  Description  INTERVENTIONS:  - Assess patient's baseline mobility status (ambulation, transfers, stairs, etc )    - Identify cognitive and physical deficits and behaviors that affect mobility  - Identify mobility aids required to assist with transfers and/or ambulation (gait belt, sit-to-stand, lift, walker, cane, etc )  - [place] fall precautions as indicated by assessment  - Record patient progress and toleration of activity level on Mobility SBAR; progress patient to next Phase/Stage  - Instruct patient to call for assistance with activity based on assessment  - Request Rehabilitation consult to assist with strengthening/weightbearing, etc   Outcome: Adequate for Discharge     Problem: DISCHARGE PLANNING  Goal: Discharge to home or other facility with appropriate resources  Description  INTERVENTIONS:  - Identify barriers to discharge w/patient and caregiver  - Arrange for needed discharge resources and transportation as appropriate  - Identify discharge learning needs (meds, wound care, etc )  - Arrange for interpretive services to assist at discharge as needed  - Refer to Case Management Department for coordinating discharge planning if the patient needs post-hospital services based on physician/advanced practitioner order or complex needs related to functional status, cognitive ability, or social support system  Outcome: Adequate for Discharge     Problem: Knowledge Deficit  Goal: Patient/family/caregiver demonstrates understanding of disease process, treatment plan, medications, and discharge instructions  Description  Complete learning assessment and assess knowledge base  Interventions:  - Provide teaching at level of understanding  - Provide teaching via preferred learning methods  Outcome: Adequate for Discharge     Problem: CARDIOVASCULAR - ADULT  Goal: Maintains optimal cardiac output and hemodynamic stability  Description  INTERVENTIONS:  - Monitor I/O, vital signs and rhythm  - Monitor for S/S and trends of decreased cardiac output i e  bleeding, hypotension  - Administer and titrate ordered vasoactive medications to optimize hemodynamic stability  - Assess quality of pulses, skin color and temperature  - Assess for signs of decreased coronary artery perfusion - ex  Angina  - Instruct patient to report change in severity of symptoms  Outcome: Adequate for Discharge  Goal: Absence of cardiac dysrhythmias or at baseline rhythm  Description  INTERVENTIONS:  - Continuous cardiac monitoring, monitor vital signs, obtain 12 lead EKG if indicated  - Administer antiarrhythmic and heart rate control medications as ordered  - Monitor electrolytes and administer replacement therapy as ordered  Outcome: Adequate for Discharge     Problem: Nutrition/Hydration-ADULT  Goal: Nutrient/Hydration intake appropriate for improving, restoring or maintaining nutritional needs  Description  Monitor and assess patient's nutrition/hydration status for malnutrition (ex- brittle hair, bruises, dry skin, pale skin and conjunctiva, muscle wasting, smooth red tongue, and disorientation)  Collaborate with interdisciplinary team and initiate plan and interventions as ordered    Monitor patient's weight and dietary intake as ordered or per policy  Utilize nutrition screening tool and intervene per policy  Determine patient's food preferences and provide high-protein, high-caloric foods as appropriate       INTERVENTIONS:  - Monitor oral intake, urinary output, labs, and treatment plans  - Assess nutrition and hydration status and recommend course of action  - Evaluate amount of meals eaten  - Assist patient with eating if necessary   - Allow adequate time for meals  - Recommend/ encourage appropriate diets, oral nutritional supplements, and vitamin/mineral supplements  - Order, calculate, and assess calorie counts as needed  - Recommend, monitor, and adjust tube feedings and TPN/PPN based on assessed needs  - Assess need for intravenous fluids  - Provide specific nutrition/hydration education as appropriate  - Include patient/family/caregiver in decisions related to nutrition  Outcome: Adequate for Discharge     Problem: GASTROINTESTINAL - ADULT  Goal: Minimal or absence of nausea and/or vomiting  Description  INTERVENTIONS:  - Administer IV fluids as ordered to ensure adequate hydration  - Maintain NPO status until nausea and vomiting are resolved  - Nasogastric tube as ordered  - Administer ordered antiemetic medications as needed  - Provide nonpharmacologic comfort measures as appropriate  - Advance diet as tolerated, if ordered  - Nutrition services referral to assist patient with adequate nutrition and appropriate food choices  Outcome: Adequate for Discharge  Goal: Maintains adequate nutritional intake  Description  INTERVENTIONS:  - Monitor percentage of each meal consumed  - Identify factors contributing to decreased intake, treat as appropriate  - Assist with meals as needed  - Monitor I&O, WT and lab values  - Obtain nutrition services referral as needed  Outcome: Adequate for Discharge     Problem: METABOLIC, FLUID AND ELECTROLYTES - ADULT  Goal: Glucose maintained within target range  Description  INTERVENTIONS:  - Monitor Blood Glucose as ordered  - Assess for signs and symptoms of hyperglycemia and hypoglycemia  - Administer ordered medications to maintain glucose within target range  - Assess nutritional intake and initiate nutrition service referral as needed  Outcome: Adequate for Discharge     Problem: Potential for Falls  Goal: Patient will remain free of falls  Description  INTERVENTIONS:  - Assess patient frequently for physical needs  -  Identify cognitive and physical deficits and behaviors that affect risk of falls    -  [place] fall precautions as indicated by assessment   - Educate patient/family on patient safety including physical limitations  - Instruct patient to call for assistance with activity based on assessment  - Modify environment to reduce risk of injury  - Consider OT/PT consult to assist with strengthening/mobility  Outcome: Adequate for Discharge

## 2019-07-11 NOTE — PLAN OF CARE
Problem: PAIN - ADULT  Goal: Verbalizes/displays adequate comfort level or baseline comfort level  Description  Interventions:  - Encourage patient to monitor pain and request assistance  - Assess pain using appropriate pain scale  - Administer analgesics based on type and severity of pain and evaluate response  - Implement non-pharmacological measures as appropriate and evaluate response  - Consider cultural and social influences on pain and pain management  - Notify physician/advanced practitioner if interventions unsuccessful or patient reports new pain  Outcome: Progressing     Problem: INFECTION - ADULT  Goal: Absence or prevention of progression during hospitalization  Description  INTERVENTIONS:  - Assess and monitor for signs and symptoms of infection  - Monitor lab/diagnostic results  - Monitor all insertion sites, i e  indwelling lines, tubes, and drains  - [place] appropriate cooling/warming therapies per order  - Administer medications as ordered  - Instruct and encourage patient and family to use good hand hygiene technique  - Identify and instruct in appropriate isolation precautions for identified infection/condition   Outcome: Progressing     Problem: SAFETY ADULT  Goal: Patient will remain free of falls  Description  INTERVENTIONS:  - Assess patient frequently for physical needs  -  Identify cognitive and physical deficits and behaviors that affect risk of falls    -  [place] fall precautions as indicated by assessment   - Educate patient/family on patient safety including physical limitations  - Instruct patient to call for assistance with activity based on assessment  - Modify environment to reduce risk of injury  - Consider OT/PT consult to assist with strengthening/mobility  Outcome: Progressing  Goal: Maintain or return to baseline ADL function  Description  INTERVENTIONS:  -  Assess patient's ability to carry out ADLs; assess patient's baseline for ADL function and identify physical deficits which impact ability to perform ADLs (bathing, care of mouth/teeth, toileting, grooming, dressing, etc )  - Assess/evaluate cause of self-care deficits   - Assess range of motion  - Assess patient's mobility; develop plan if impaired  - Assess patient's need for assistive devices and provide as appropriate  - Encourage maximum independence but intervene and supervise when necessary  ¯ Involve family in performance of ADLs  ¯ Assess for home care needs following discharge   ¯ Request OT consult to assist with ADL evaluation and planning for discharge  ¯ Provide patient education as appropriate  Outcome: Progressing  Goal: Maintain or return mobility status to optimal level  Description  INTERVENTIONS:  - Assess patient's baseline mobility status (ambulation, transfers, stairs, etc )    - Identify cognitive and physical deficits and behaviors that affect mobility  - Identify mobility aids required to assist with transfers and/or ambulation (gait belt, sit-to-stand, lift, walker, cane, etc )  - [place] fall precautions as indicated by assessment  - Record patient progress and toleration of activity level on Mobility SBAR; progress patient to next Phase/Stage  - Instruct patient to call for assistance with activity based on assessment  - Request Rehabilitation consult to assist with strengthening/weightbearing, etc   Outcome: Progressing     Problem: DISCHARGE PLANNING  Goal: Discharge to home or other facility with appropriate resources  Description  INTERVENTIONS:  - Identify barriers to discharge w/patient and caregiver  - Arrange for needed discharge resources and transportation as appropriate  - Identify discharge learning needs (meds, wound care, etc )  - Arrange for interpretive services to assist at discharge as needed  - Refer to Case Management Department for coordinating discharge planning if the patient needs post-hospital services based on physician/advanced practitioner order or complex needs related to functional status, cognitive ability, or social support system  Outcome: Progressing     Problem: Knowledge Deficit  Goal: Patient/family/caregiver demonstrates understanding of disease process, treatment plan, medications, and discharge instructions  Description  Complete learning assessment and assess knowledge base  Interventions:  - Provide teaching at level of understanding  - Provide teaching via preferred learning methods  Outcome: Progressing     Problem: CARDIOVASCULAR - ADULT  Goal: Maintains optimal cardiac output and hemodynamic stability  Description  INTERVENTIONS:  - Monitor I/O, vital signs and rhythm  - Monitor for S/S and trends of decreased cardiac output i e  bleeding, hypotension  - Administer and titrate ordered vasoactive medications to optimize hemodynamic stability  - Assess quality of pulses, skin color and temperature  - Assess for signs of decreased coronary artery perfusion - ex   Angina  - Instruct patient to report change in severity of symptoms  Outcome: Progressing  Goal: Absence of cardiac dysrhythmias or at baseline rhythm  Description  INTERVENTIONS:  - Continuous cardiac monitoring, monitor vital signs, obtain 12 lead EKG if indicated  - Administer antiarrhythmic and heart rate control medications as ordered  - Monitor electrolytes and administer replacement therapy as ordered  Outcome: Progressing

## 2019-07-11 NOTE — ASSESSMENT & PLAN NOTE
No results found for: HGBA1C    No results for input(s): POCGLU in the last 72 hours  Blood Sugar Average: Last 72 hrs:    Per Care Everywhere the patient is suppose to be taking Metformin 500 mg po daily  This is likely the cause of the patient's lactic acidosis  It is recommended the patient stop taking Metformin  She was started on Trajenta 5 mg PO daily  She should also be on a low dose ACE-I for rental protection in the setting of diabetes if her blood pressure allows  The patient will need to follow-up PCP regarding this

## 2019-07-11 NOTE — UTILIZATION REVIEW
Initial Clinical Review    Admission: Date/Time/Statement: OBSERVATION 7/11/19 @ 0509    Orders Placed This Encounter   Procedures    Place in Observation (expected length of stay for this patient is less than two midnights)     Standing Status:   Standing     Number of Occurrences:   1     Order Specific Question:   Admitting Physician     Answer:   Earnestine Mcmanus [[id number]]     Order Specific Question:   Level of Care     Answer:   Med Surg [[id number]]     ED Arrival Information     Expected Arrival Acuity Means of Arrival Escorted By Service Admission Type    - 7/11/2019 03:25 Urgent Walk-In Family Member General Medicine Urgent    Arrival Complaint    -        Chief Complaint   Patient presents with    Chest Pain     Patient has substernal and upper epigastric pain that started 20 minutes ago  Assessment/Plan:  MRS OLSON IS A 73 YO FEMALE WHO PRESENTS TO THE ED WITH C/O SUBSTERNAL AND MID-EPIGASTRIC PAIN  SHE WAS DIAGNOSED BY [place] PCP WITH GASTRITIS 8 MONTHS AGO  DENIES OTHER SYMPTOMS EXCEPT CONSTIPATION WITH LAXATIVE USE  INITIAL TROP IS NEGATIVE AND ECG UNREMARKABLE  HAS ELEVATED TSH  SHE IS ADMITTED TO OBSERVATION WITH CHEST PAIN - TELE, TREND TROP, CARDIAC ECHO , PRN NTG   ELEVATED LACTIC ACID - TREND TIL NORMAL  ELEVATED TSH       7/11 AFTERNOON REVIEW - NO NEW NOTATIONS  PT STILL WITHIN 24 HR OF ARRIVAL        ED Triage Vitals   Temperature Pulse Respirations Blood Pressure SpO2   07/11/19 0351 07/11/19 0351 07/11/19 0351 07/11/19 0351 07/11/19 0351   98 7 °F (37 1 °C) 59 20 127/73 100 %      Temp Source Heart Rate Source Patient Position - Orthostatic VS BP Location FiO2 (%)   07/11/19 0351 07/11/19 0430 07/11/19 0430 07/11/19 0430 --   Temporal Monitor Lying Left arm       Pain Score       07/11/19 0351       Worst Possible Pain        Wt Readings from Last 1 Encounters:   07/11/19 76 4 kg (168 lb 6 9 oz)     Additional Vital Signs:   07/11/19 0946    62  16      96 %     07/11/19 0745 97 5 °F (36 4 °C)  59  17  102/65    99 %  None (Room air)   07/11/19 0545  97 8 °F (36 6 °C)  70  16  128/74  91  100 %  None (Room air)   07/11/19 0500    72  20  115/67    96 %  None (Room air)   07/11/19 0430    73  20  115/59    98 %  None (Room air)     Pertinent Labs/Diagnostic Test Results:     7/11 CXR - NO ACUTE DISEASE     7/11 CT ABD, PELVIS -  Calcifications within the uterus likely representing fibroids      7/11 ECG - ST WITH NON-SPECIFIC ST SEGMENTS    7/11 NM STRESS TEST - PENDING     7/11 CARDIAC ECHO - PENDING     Results from last 7 days   Lab Units 07/11/19  0641 07/11/19  0334   WBC Thousand/uL  --  10 82*   HEMOGLOBIN g/dL  --  13 0   HEMATOCRIT %  --  40 7   PLATELETS Thousands/uL 262 247   NEUTROS ABS Thousands/µL  --  4 55     Results from last 7 days   Lab Units 07/11/19  0334   SODIUM mmol/L 140   POTASSIUM mmol/L 4 0   CHLORIDE mmol/L 106   CO2 mmol/L 31   ANION GAP mmol/L 3*   BUN mg/dL 11   CREATININE mg/dL 1 16   EGFR ml/min/1 73sq m 46   CALCIUM mg/dL 8 5   MAGNESIUM mg/dL 2 2     Results from last 7 days   Lab Units 07/11/19  0334   AST U/L 19   ALT U/L 28   ALK PHOS U/L 117*   TOTAL PROTEIN g/dL 6 8   ALBUMIN g/dL 3 5   TOTAL BILIRUBIN mg/dL 0 30     Results from last 7 days   Lab Units 07/11/19  0334   GLUCOSE RANDOM mg/dL 148*     Results from last 7 days   Lab Units 07/11/19  0901 07/11/19  0641 07/11/19  0334   TROPONIN I ng/mL <0 02 <0 02 <0 02     Results from last 7 days   Lab Units 07/11/19  0334   D DIMER QUANT ng/ml (FEU) <270     Results from last 7 days   Lab Units 07/11/19  0334   PROTIME seconds 13 0   INR  0 98   PTT seconds 31     Results from last 7 days   Lab Units 07/11/19  0901 07/11/19  0641 07/11/19  0334   LACTIC ACID mmol/L 2 1* 2 2* 2 3*     Results from last 7 days   Lab Units 07/11/19  0334   LIPASE u/L 301     ED Treatment:   Medication Administration from 07/11/2019 0325 to 07/11/2019 0543    Date/Time Order Dose Route Action   07/11/2019 1100 [facility] [place] aluminum-magnesium hydroxide-simethicone (MYLANTA) 200-200-20 mg/5 mL oral suspension 30 mL 30 mL Oral Given   07/11/2019 0342 sodium chloride 0 9 % bolus 1,000 mL 1,000 mL Intravenous New Bag   07/11/2019 0342 pantoprazole (PROTONIX) injection 40 mg 40 mg Intravenous Given   07/11/2019 0358 aspirin chewable tablet 324 mg 324 mg Oral Given   07/11/2019 0358 nitroglycerin (NITRO-BID) 2 % TD ointment 0 5 inch 0 5 inch Topical Given   07/11/2019 0420 iohexol (OMNIPAQUE) 350 MG/ML injection (SINGLE-DOSE) 100 mL 100 mL Intravenous Given        History reviewed  No pertinent past medical history  Present on Admission:   Elevated TSH   Other chest pain   Elevated lactic acid level    Admitting Diagnosis: Lactic acidosis [E87 2]  Chest pain [R07 9]  Abdominal pain [R10 9]  Hypothyroid [E03 9]  Hyperglycemia [R73 9]     Age/Sex: 76 y o  female     Admission Orders:    Current Facility-Administered Medications:  enoxaparin 40 mg Subcutaneous Daily   nitroglycerin 0 4 mg Sublingual Q5 Min PRN   thiamine 200 mg Intravenous Once     TELE  SCDs  UP W/ ASSIST   DIET CARDIAC STRESS   PROCALCITONIN   IP CONSULT TO CASE MANAGEMENT  STRESS TEST   CARDIAC ECHO     Network Utilization Review Department  Phone: [phone]; Fax [phone]  [email or web address]  ATTENTION: Please call with any questions or concerns to [phone]  and carefully listen to the prompts so that you are directed to the right person  Send all requests for admission clinical reviews, approved or denied determinations and any other requests to fax [phone]   All voicemails are confidential

## 2019-07-12 LAB
ATRIAL RATE: 64 BPM
ATRIAL RATE: 65 BPM
ATRIAL RATE: 68 BPM
P AXIS: 55 DEGREES
P AXIS: 56 DEGREES
P AXIS: 61 DEGREES
PR INTERVAL: 188 MS
PR INTERVAL: 200 MS
PR INTERVAL: 206 MS
QRS AXIS: 56 DEGREES
QRS AXIS: 61 DEGREES
QRS AXIS: 71 DEGREES
QRSD INTERVAL: 80 MS
QRSD INTERVAL: 82 MS
QRSD INTERVAL: 84 MS
QT INTERVAL: 402 MS
QT INTERVAL: 404 MS
QT INTERVAL: 422 MS
QTC INTERVAL: 418 MS
QTC INTERVAL: 429 MS
QTC INTERVAL: 435 MS
T WAVE AXIS: 60 DEGREES
T WAVE AXIS: 63 DEGREES
T WAVE AXIS: 69 DEGREES
VENTRICULAR RATE: 64 BPM
VENTRICULAR RATE: 65 BPM
VENTRICULAR RATE: 68 BPM

## 2019-07-12 PROCEDURE — 93010 ELECTROCARDIOGRAM REPORT: CPT | Performed by: INTERNAL MEDICINE

## 2019-07-12 NOTE — SOCIAL WORK
7/12/19 Pt was discharged yesterday afternoon  Pt's son gave patient a ride home  Discharge instructions and follow up instructions reviewed with patient and son by nursing

## 2024-04-06 ENCOUNTER — HOSPITAL ENCOUNTER (EMERGENCY)
Facility: HOSPITAL | Age: 79
Discharge: HOME | End: 2024-04-07
Attending: EMERGENCY MEDICINE
Payer: COMMERCIAL

## 2024-04-06 ENCOUNTER — APPOINTMENT (EMERGENCY)
Dept: RADIOLOGY | Facility: HOSPITAL | Age: 79
End: 2024-04-06
Payer: COMMERCIAL

## 2024-04-06 VITALS
RESPIRATION RATE: 15 BRPM | TEMPERATURE: 98.2 F | DIASTOLIC BLOOD PRESSURE: 65 MMHG | HEART RATE: 98 BPM | OXYGEN SATURATION: 96 % | SYSTOLIC BLOOD PRESSURE: 138 MMHG

## 2024-04-06 DIAGNOSIS — M25.561 RIGHT KNEE PAIN: Primary | ICD-10-CM

## 2024-04-06 LAB
ATRIAL RATE: 91 BPM
P AXIS: 70 DEGREES
PR INTERVAL: 168 MS
QRS AXIS: 72 DEGREES
QRSD INTERVAL: 76 MS
QT INTERVAL: 352 MS
QTC INTERVAL: 432 MS
T WAVE AXIS: 65 DEGREES
VENTRICULAR RATE: 91 BPM

## 2024-04-06 PROCEDURE — 73552 X-RAY EXAM OF FEMUR 2/>: CPT

## 2024-04-06 PROCEDURE — 0241U HB NFCT DS VIR RESP RNA 4 TRGT: CPT

## 2024-04-06 PROCEDURE — 80053 COMPREHEN METABOLIC PANEL: CPT

## 2024-04-06 PROCEDURE — 36415 COLL VENOUS BLD VENIPUNCTURE: CPT

## 2024-04-06 PROCEDURE — 84484 ASSAY OF TROPONIN QUANT: CPT

## 2024-04-06 PROCEDURE — 71045 X-RAY EXAM CHEST 1 VIEW: CPT

## 2024-04-06 PROCEDURE — 85379 FIBRIN DEGRADATION QUANT: CPT

## 2024-04-06 PROCEDURE — 85025 COMPLETE CBC W/AUTO DIFF WBC: CPT

## 2024-04-06 PROCEDURE — 93005 ELECTROCARDIOGRAM TRACING: CPT

## 2024-04-07 LAB
2HR DELTA HS TROPONIN: 1 NG/L
ALBUMIN SERPL BCP-MCNC: 3.8 G/DL (ref 3.5–5)
ALP SERPL-CCNC: 129 U/L (ref 34–104)
ALT SERPL W P-5'-P-CCNC: 27 U/L (ref 7–52)
ANION GAP SERPL CALCULATED.3IONS-SCNC: 8 MMOL/L (ref 4–13)
AST SERPL W P-5'-P-CCNC: 25 U/L (ref 13–39)
BASOPHILS # BLD AUTO: 0.03 THOUSANDS/ÂΜL (ref 0–0.1)
BASOPHILS NFR BLD AUTO: 0 % (ref 0–1)
BILIRUB SERPL-MCNC: 0.27 MG/DL (ref 0.2–1)
BUN SERPL-MCNC: 20 MG/DL (ref 5–25)
CALCIUM SERPL-MCNC: 8.4 MG/DL (ref 8.4–10.2)
CARDIAC TROPONIN I PNL SERPL HS: 2 NG/L
CARDIAC TROPONIN I PNL SERPL HS: 3 NG/L
CHLORIDE SERPL-SCNC: 105 MMOL/L (ref 96–108)
CO2 SERPL-SCNC: 24 MMOL/L (ref 21–32)
CREAT SERPL-MCNC: 1.03 MG/DL (ref 0.6–1.3)
D DIMER PPP FEU-MCNC: 0.48 UG/ML FEU
EOSINOPHIL # BLD AUTO: 0 THOUSAND/ÂΜL (ref 0–0.61)
EOSINOPHIL NFR BLD AUTO: 0 % (ref 0–6)
ERYTHROCYTE [DISTWIDTH] IN BLOOD BY AUTOMATED COUNT: 13.3 % (ref 11.6–15.1)
FLUAV RNA RESP QL NAA+PROBE: NEGATIVE
FLUBV RNA RESP QL NAA+PROBE: NEGATIVE
GFR SERPL CREATININE-BSD FRML MDRD: 51 ML/MIN/1.73SQ M
GLUCOSE SERPL-MCNC: 123 MG/DL (ref 65–140)
HCT VFR BLD AUTO: 44.8 % (ref 34.8–46.1)
HGB BLD-MCNC: 14.7 G/DL (ref 11.5–15.4)
IMM GRANULOCYTES # BLD AUTO: 0.08 THOUSAND/UL (ref 0–0.2)
IMM GRANULOCYTES NFR BLD AUTO: 1 % (ref 0–2)
LYMPHOCYTES # BLD AUTO: 5.33 THOUSANDS/ÂΜL (ref 0.6–4.47)
LYMPHOCYTES NFR BLD AUTO: 49 % (ref 14–44)
MCH RBC QN AUTO: 29.9 PG (ref 26.8–34.3)
MCHC RBC AUTO-ENTMCNC: 32.8 G/DL (ref 31.4–37.4)
MCV RBC AUTO: 91 FL (ref 82–98)
MONOCYTES # BLD AUTO: 1.14 THOUSAND/ÂΜL (ref 0.17–1.22)
MONOCYTES NFR BLD AUTO: 10 % (ref 4–12)
NEUTROPHILS # BLD AUTO: 4.39 THOUSANDS/ÂΜL (ref 1.85–7.62)
NEUTS SEG NFR BLD AUTO: 40 % (ref 43–75)
NRBC BLD AUTO-RTO: 0 /100 WBCS
PLATELET # BLD AUTO: 288 THOUSANDS/UL (ref 149–390)
PMV BLD AUTO: 8.5 FL (ref 8.9–12.7)
POTASSIUM SERPL-SCNC: 4.2 MMOL/L (ref 3.5–5.3)
PROT SERPL-MCNC: 6.6 G/DL (ref 6.4–8.4)
RBC # BLD AUTO: 4.92 MILLION/UL (ref 3.81–5.12)
RSV RNA RESP QL NAA+PROBE: NEGATIVE
SARS-COV-2 RNA RESP QL NAA+PROBE: NEGATIVE
SODIUM SERPL-SCNC: 137 MMOL/L (ref 135–147)
WBC # BLD AUTO: 10.97 THOUSAND/UL (ref 4.31–10.16)

## 2024-04-07 PROCEDURE — 36415 COLL VENOUS BLD VENIPUNCTURE: CPT

## 2024-04-07 PROCEDURE — 84484 ASSAY OF TROPONIN QUANT: CPT

## 2024-04-07 PROCEDURE — 99285 EMERGENCY DEPT VISIT HI MDM: CPT

## 2024-04-07 RX ORDER — KETOROLAC TROMETHAMINE 30 MG/ML
15 INJECTION, SOLUTION INTRAMUSCULAR; INTRAVENOUS ONCE
Status: COMPLETED | OUTPATIENT
Start: 2024-04-07 | End: 2024-04-07

## 2024-04-07 RX ADMIN — KETOROLAC TROMETHAMINE 15 MG: 30 INJECTION, SOLUTION INTRAMUSCULAR at 01:40

## 2024-04-07 NOTE — ED PROVIDER NOTES
History  Chief Complaint   Patient presents with    Knee Pain     Pt states that she started with right knee pain after watching a movie tonight. She was sitting in the same position for 1.5hrs for the movie and could not get up after. The grandson states that when she couldn't get up form pain she started to have a hard time breathing     The patient is a 79 y.o. female with a history of cholecystectomy who presents to [facility] with a chief complaint of right leg pain. Symptoms began tonight after sitting through a movie at the movie theatre and have been improved since onset. Her pain is currently rated as a 3/10 in severity and described as sharp with radiation form the knee to the thigh. Associated symptoms include trouble breathing briefly that has since resolved. Symptoms are aggravated with movement and palpation and alleviating factors include resting. The patient denies fever, chills, night sweats, headache, dizziness, syncope, chest pain, current shortness of breath, cough, wheezing, COPD, nausea, vomiting, diarrhea, falls, trauma. No other reported symptoms at this time.  Patient denies allergies to anything   Patient reports symptoms have improved         History provided by:  Patient and relative   used: Yes (Grandson)    Knee Pain  Associated symptoms: no back pain, no fever and no neck pain        Prior to Admission Medications   Prescriptions Last Dose Informant Patient Reported? Taking?   atorvastatin (LIPITOR) 10 mg tablet   No No   Sig: Take 1 tablet (10 mg total) by mouth daily   levothyroxine 50 mcg tablet   No No   Sig: Take 1 tablet (50 mcg total) by mouth daily   linaGLIPtin 5 MG TABS   No No   Sig: Take 5 mg by mouth daily   pantoprazole (PROTONIX) 20 mg tablet   No No   Sig: Take 1 tablet (20 mg total) by mouth daily      Facility-Administered Medications: None       No past medical history on file.    Past Surgical History:   Procedure Laterality Date     CHOLECYSTECTOMY         No family history on file.  I have reviewed and agree with the history as documented.    E-Cigarette/Vaping     E-Cigarette/Vaping Substances     Social History     Tobacco Use    Smoking status: Never    Smokeless tobacco: Never   Substance Use Topics    Alcohol use: Not Currently    Drug use: Not Currently       Review of Systems   Constitutional:  Negative for chills and fever.   HENT:  Negative for ear discharge, ear pain, facial swelling, hearing loss, nosebleeds, rhinorrhea, sinus pressure, sore throat, tinnitus and trouble swallowing.    Eyes:  Negative for photophobia, pain and redness.   Respiratory:  Negative for cough, chest tightness, shortness of breath, wheezing and stridor.    Cardiovascular:  Negative for chest pain, palpitations and leg swelling.   Gastrointestinal:  Negative for abdominal pain, blood in stool, constipation, diarrhea, nausea, rectal pain and vomiting.   Genitourinary:  Negative for decreased urine volume, difficulty urinating, dysuria, flank pain, frequency, hematuria and urgency.   Musculoskeletal:  Positive for arthralgias. Negative for back pain, gait problem, joint swelling, myalgias, neck pain and neck stiffness.   Skin:  Negative for color change, pallor, rash and wound.   Neurological:  Negative for dizziness, tremors, seizures, speech difficulty, weakness, light-headedness, numbness and headaches.       Physical Exam  Physical Exam  Vitals reviewed.   Constitutional:       General: She is not in acute distress.     Appearance: Normal appearance. She is not ill-appearing.   HENT:      Head: Normocephalic and atraumatic.      Right Ear: Tympanic membrane normal.      Left Ear: Tympanic membrane normal.      Nose: Nose normal. No congestion.      Mouth/Throat:      Pharynx: Oropharynx is clear. No oropharyngeal exudate.   Eyes:      Conjunctiva/sclera: Conjunctivae normal.   Cardiovascular:      Rate and Rhythm: Normal rate.      Pulses: Normal  pulses.   Pulmonary:      Effort: Pulmonary effort is normal. No respiratory distress.      Breath sounds: No stridor. No wheezing, rhonchi or rales.   Chest:      Chest wall: No tenderness.   Abdominal:      General: Abdomen is flat.      Tenderness: There is no abdominal tenderness.   Musculoskeletal:         General: No swelling or tenderness. Normal range of motion.      Cervical back: Normal range of motion and neck supple.      Right upper leg: Normal.      Left upper leg: Normal.      Right knee: Bony tenderness present. No swelling, deformity, effusion, erythema, ecchymosis, lacerations or crepitus. Normal range of motion. Normal alignment. Normal pulse.      Right lower leg: Normal. No swelling, deformity, lacerations, tenderness or bony tenderness. No edema.      Left lower leg: No edema.      Right ankle: Normal.      Right Achilles Tendon: Normal.      Right foot: Normal pulse.      Comments: Tenderness to the right knee, no visible deformity, rash, ecchymosis, swelling, full range of motion of the lower leg, sensation motor intact, PT and DP pulses 2+   Skin:     General: Skin is warm and dry.      Capillary Refill: Capillary refill takes less than 2 seconds.      Coloration: Skin is not jaundiced.      Findings: No bruising.   Neurological:      Mental Status: She is alert and oriented to person, place, and time. Mental status is at baseline.         Vital Signs  ED Triage Vitals   Temperature Pulse Respirations Blood Pressure SpO2   04/06/24 2349 04/06/24 2342 04/06/24 2342 04/06/24 2342 04/06/24 2342   98.2 °F (36.8 °C) 98 15 138/65 96 %      Temp Source Heart Rate Source Patient Position - Orthostatic VS BP Location FiO2 (%)   04/06/24 2349 04/06/24 2342 04/06/24 2342 04/06/24 2342 --   Oral Monitor Lying Right arm       Pain Score       04/06/24 2346       4           Vitals:    04/06/24 2342   BP: 138/65   Pulse: 98   Patient Position - Orthostatic VS: Lying         Visual Acuity      ED  Medications  Medications   ketorolac (TORADOL) injection 15 mg (15 mg Intravenous Given 4/7/24 0140)       Diagnostic Studies  Results Reviewed       Procedure Component Value Units Date/Time    HS Troponin I 2hr [[id number]]  (Normal) Collected: 04/07/24 0148    Lab Status: Final result Specimen: Blood from Arm, Right Updated: 04/07/24 0216     hs TnI 2hr 3 ng/L      Delta 2hr hsTnI 1 ng/L     FLU/RSV/COVID - if FLU/RSV clinically relevant [[id number]]  (Normal) Collected: 04/06/24 2358    Lab Status: Final result Specimen: Nares from Nose Updated: 04/07/24 0045     SARS-CoV-2 Negative     INFLUENZA A PCR Negative     INFLUENZA B PCR Negative     RSV PCR Negative    Narrative:      FOR PEDIATRIC PATIENTS - copy/paste COVID Guidelines URL to browser: [email or web address]    SARS-CoV-2 assay is a Nucleic Acid Amplification assay intended for the  qualitative detection of nucleic acid from SARS-CoV-2 in nasopharyngeal  swabs. Results are for the presumptive identification of SARS-CoV-2 RNA.    Positive results are indicative of infection with SARS-CoV-2, the virus  causing COVID-19, but do not rule out bacterial infection or co-infection  with other viruses. Laboratories within the [place] and its  territories are required to report all positive results to the appropriate  public health authorities. Negative results do not preclude SARS-CoV-2  infection and should not be used as the sole basis for treatment or other  patient management decisions. Negative results must be combined with  clinical observations, patient history, and epidemiological information.  This test has not been FDA cleared or approved.    This test has been authorized by FDA under an Emergency Use Authorization  (EUA). This test is only authorized for the duration of time the  declaration that circumstances exist justifying the authorization of the  emergency use of an in vitro diagnostic  tests for detection of SARS-CoV-2  virus and/or diagnosis of COVID-19 infection under section 564(b)(1) of  the Act, 21 U.S.C. 360bbb-3(b)(1), unless the authorization is terminated  or revoked sooner. The test has been validated but independent review by FDA  and CLIA is pending.    Test performed using [employer] GeneXpert: This RT-PCR assay targets N2,  a region unique to SARS-CoV-2. A conserved region in the E-gene was chosen  for pan-Sarbecovirus detection which includes SARS-CoV-2.    According to CMS-2020-01-R, this platform meets the definition of high-throughput technology.    HS Troponin 0hr (reflex protocol) [[id number]]  (Normal) Collected: 04/06/24 2358    Lab Status: Final result Specimen: Blood from Arm, Left Updated: 04/07/24 0031     hs TnI 0hr 2 ng/L     Comprehensive metabolic panel [[id number]]  (Abnormal) Collected: 04/06/24 2358    Lab Status: Final result Specimen: Blood from Arm, Left Updated: 04/07/24 0025     Sodium 137 mmol/L      Potassium 4.2 mmol/L      Chloride 105 mmol/L      CO2 24 mmol/L      ANION GAP 8 mmol/L      BUN 20 mg/dL      Creatinine 1.03 mg/dL      Glucose 123 mg/dL      Calcium 8.4 mg/dL      AST 25 U/L      ALT 27 U/L      Alkaline Phosphatase 129 U/L      Total Protein 6.6 g/dL      Albumin 3.8 g/dL      Total Bilirubin 0.27 mg/dL      eGFR 51 ml/min/1.73sq m     Narrative:      Specimen Lipemic.  National Kidney Disease Foundation guidelines for Chronic Kidney Disease (CKD):     Stage 1 with normal or high GFR (GFR > 90 mL/min/1.73 square meters)    Stage 2 Mild CKD (GFR = 60-89 mL/min/1.73 square meters)    Stage 3A Moderate CKD (GFR = 45-59 mL/min/1.73 square meters)    Stage 3B Moderate CKD (GFR = 30-44 mL/min/1.73 square meters)    Stage 4 Severe CKD (GFR = 15-29 mL/min/1.73 square meters)    Stage 5 End Stage CKD (GFR <15 mL/min/1.73 square meters)  Note: GFR calculation is accurate only with a steady state creatinine    D-Dimer [[id number]]  (Normal) Collected:  04/06/24 2358    Lab Status: Final result Specimen: Blood from Arm, Left Updated: 04/07/24 0020     D-Dimer, Quant 0.48 ug/ml FEU     Narrative:      In the evaluation for possible pulmonary embolism, in the appropriate (Well's Score of 4 or less) patient, the age adjusted d-dimer cutoff for this patient can be calculated as:    Age x 0.01 (in ug/mL) for Age-adjusted D-dimer exclusion threshold for a patient over 50 years.    CBC and differential [[id number]]  (Abnormal) Collected: 04/06/24 2358    Lab Status: Final result Specimen: Blood from Arm, Left Updated: 04/07/24 0018     WBC 10.97 Thousand/uL      RBC 4.92 Million/uL      Hemoglobin 14.7 g/dL      Hematocrit 44.8 %      MCV 91 fL      MCH 29.9 pg      MCHC 32.8 g/dL      RDW 13.3 %      MPV 8.5 fL      Platelets 288 Thousands/uL      nRBC 0 /100 WBCs      Neutrophils Relative 40 %      Immature Grans % 1 %      Lymphocytes Relative 49 %      Monocytes Relative 10 %      Eosinophils Relative 0 %      Basophils Relative 0 %      Neutrophils Absolute 4.39 Thousands/µL      Absolute Immature Grans 0.08 Thousand/uL      Absolute Lymphocytes 5.33 Thousands/µL      Absolute Monocytes 1.14 Thousand/µL      Eosinophils Absolute 0.00 Thousand/µL      Basophils Absolute 0.03 Thousands/µL     Narrative:      This is an appended report.  These results have been appended to a previously verified report.                   XR chest 1 view portable   ED Interpretation by Tahir Horta PA-C (04/07 0136)   No acute cardiopulmonary disease      XR femur 2 views RIGHT   ED Interpretation by Tahir Horta PA-C (04/07 0137)   No acute osseous abnormality to the femur, arthritis of the knee                 Procedures  Procedures         ED Course  ED Course as of 04/07/24 0652   Sun Apr 07, 2024   0032 D-Dimer, Quant: 0.48   0048 SARS-COV-2: Negative   0048 INFLU A PCR: Negative   0048 INFLU B PCR: Negative   0048 RSV PCR: Negative   0131 Patient ambulating back and forth to the  bathroom without assistance.   0217 Delta 2hr hsTnI: 1             HEART Risk Score      Flowsheet Row Most Recent Value   Heart Score Risk Calculator    History 0 Filed at: 04/07/2024 0651   ECG 0 Filed at: 04/07/2024 0651   Age 2 Filed at: 04/07/2024 0651   Risk Factors 1 Filed at: 04/07/2024 0651   Troponin 0 Filed at: 04/07/2024 0651   HEART Score 3 Filed at: 04/07/2024 0651                  PERC Rule for PE      Flowsheet Row Most Recent Value   PERC Rule for PE    Age >=50 1 Filed at: 04/07/2024 0651   HR >=100 0 Filed at: 04/07/2024 0651   O2 Sat on room air < 95% 0 Filed at: 04/07/2024 0651   History of PE or DVT 0 Filed at: 04/07/2024 0651   Recent trauma or surgery 0 Filed at: 04/07/2024 0651   Hemoptysis 0 Filed at: 04/07/2024 0651   Exogenous estrogen 0 Filed at: 04/07/2024 0651   Unilateral leg swelling 0 Filed at: 04/07/2024 0651   PERC Rule for PE Results 1 Filed at: 04/07/2024 0651                SBIRT 20yo+      Flowsheet Row Most Recent Value   Initial Alcohol Screen: US AUDIT-C     1. How often do you have a drink containing alcohol? 0 Filed at: 04/06/2024 2345   2. How many drinks containing alcohol do you have on a typical day you are drinking?  0 Filed at: 04/06/2024 2345   3b. FEMALE Any Age, or MALE 65+: How often do you have 4 or more drinks on one occassion? 0 Filed at: 04/06/2024 2345   Audit-C Score 0 Filed at: 04/06/2024 2345   PIERRE: How many times in the past year have you...    Used an illegal drug or used a prescription medication for non-medical reasons? Never Filed at: 04/06/2024 2345            Austin' Criteria for PE      Flowsheet Row Most Recent Value   Wells' Criteria for PE    Clinical signs and symptoms of DVT 0 Filed at: 04/07/2024 0651   PE is primary diagnosis or equally likely 0 Filed at: 04/07/2024 0651   HR >100 0 Filed at: 04/07/2024 0651   Immobilization at least 3 days or Surgery in the previous 4 weeks 0 Filed at: 04/07/2024 0651   Previous, objectively diagnosed PE  or DVT 0 Filed at: 04/07/2024 0651   Hemoptysis 0 Filed at: 04/07/2024 0651   Malignancy with treatment within 6 months or palliative 0 Filed at: 04/07/2024 0651   Wells' Criteria Total 0 Filed at: 04/07/2024 0651                  Medical Decision Making  Patient presents with right knee joint pain. Given history, exam and workup patient likely has arthritis. I have low suspicion for fracture, dislocation, significant ligamentous injury, septic arthritis, gout flare, new autoimmune arthropathy, or gonococcal arthropathy. Patient has negative d-dimer, no signs of lower leg swelling, erythema, or cellulitis. Patient is afebrile and well-appearing, in no-acute distress. She has no numbness, weakness, decreased ROM, erythema, rash, or other concerning symptoms. CXR - no acute cardiopulmonary disease. Lab work unremarkable and dimer negative which is reassuring for no DVT. Xray showed no acute osseous abnormalities however, some severe arthritis of the knee. Discussed the results with the patient who is improved and ambulating to bathroom and back without assistance or difficulty. Recommended rest, ice, elevation, tylenol. Information for orthopedics provided. Strict return parameters given. Prior to discharge, discharge instructions were discussed with patient at bedside. Patient was provided both verbal and written instructions. Patient is understanding of the discharge instructions and is agreeable to plan of care. Return precautions were discussed with patient bedside, patient verbalized understanding of signs and symptoms that would necessitate return to the ED. All questions were answered. Patient was comfortable with the plan of care and discharged to home.       Problems Addressed:  Right knee pain: acute illness or injury    Amount and/or Complexity of Data Reviewed  Labs: ordered. Decision-making details documented in ED Course.  Radiology: ordered and independent interpretation performed.    Risk  Prescription  drug management.             Disposition  Final diagnoses:   Right knee pain     Time reflects when diagnosis was documented in both MDM as applicable and the Disposition within this note       Time User Action Codes Description Comment    4/7/2024  2:15 AM Tahir Horta Add [M25.561] Right knee pain           ED Disposition       ED Disposition   Discharge    Condition   Stable    Date/Time   Sun Apr 7, 2024 0215    Comment   Ada Jun discharge to home/self care.                   Follow-up Information       Follow up With Specialties Details Why Contact Info Additional Information    Phuc Cottrell MD Family Medicine Schedule an appointment as soon as possible for a visit   [address]  [facility] [address]  [phone]       [facility] Emergency Medicine  If symptoms worsen [address] [facility] [address] [phone] [facility] Emergency Department, [address] [place], [place], [address]    [facility] [place] [facility] Schedule an appointment as soon as possible for a visit  As needed [address] [facility] [address]  [facility] [address]  [phone] [facility] Critical access hospital [address] [facility] [address], [place], [place], [address]   [phone]            Discharge Medication List as of 4/7/2024  2:15 AM        CONTINUE these medications which have NOT CHANGED    Details   atorvastatin (LIPITOR) 10 mg tablet Take 1 tablet (10 mg total) by mouth daily, Starting Thu 7/11/2019, Print      levothyroxine 50 mcg tablet Take 1 tablet (50 mcg total) by mouth daily, Starting Thu 7/11/2019, Print      linaGLIPtin 5 MG TABS Take 5 mg by mouth daily, Starting Thu 7/11/2019, Print      pantoprazole (PROTONIX) 20 mg tablet Take 1 tablet (20 mg total) by mouth daily, Starting Thu 7/11/2019, Print             No discharge procedures  on file.    PDMP Review       None            ED Provider  Electronically Signed by             Tahir Horta PA-C  04/07/24 0710

## 2024-04-07 NOTE — DISCHARGE INSTRUCTIONS
Follow-up with PCP and orthopedics as needed.  Rest, ice, elevate, Tylenol as needed.  If any symptoms worsen or new symptoms appear return to the ER.

## 2024-04-10 PROCEDURE — 93010 ELECTROCARDIOGRAM REPORT: CPT | Performed by: INTERNAL MEDICINE
